# Patient Record
Sex: FEMALE | Race: WHITE | Employment: OTHER | URBAN - METROPOLITAN AREA
[De-identification: names, ages, dates, MRNs, and addresses within clinical notes are randomized per-mention and may not be internally consistent; named-entity substitution may affect disease eponyms.]

---

## 2017-01-01 ENCOUNTER — HOSPITAL ENCOUNTER (OUTPATIENT)
Dept: ONCOLOGY | Age: 57
Discharge: HOME | End: 2017-01-31
Attending: ALLERGY & IMMUNOLOGY | Admitting: ALLERGY & IMMUNOLOGY

## 2017-01-16 ENCOUNTER — HOSPITAL ENCOUNTER (OUTPATIENT)
Dept: ONCOLOGY | Age: 57
Discharge: HOME | End: 2017-01-16
Admitting: ALLERGY & IMMUNOLOGY

## 2017-01-16 VITALS
TEMPERATURE: 97.8 F | HEIGHT: 65 IN | SYSTOLIC BLOOD PRESSURE: 117 MMHG | WEIGHT: 150 LBS | BODY MASS INDEX: 24.99 KG/M2 | RESPIRATION RATE: 16 BRPM | HEART RATE: 88 BPM | DIASTOLIC BLOOD PRESSURE: 73 MMHG

## 2017-01-16 DIAGNOSIS — L50.8 OTHER SPECIFIED URTICARIA: ICD-10-CM

## 2017-02-01 ENCOUNTER — HOSPITAL ENCOUNTER (OUTPATIENT)
Dept: ONCOLOGY | Age: 57
Discharge: HOME | End: 2017-02-03
Attending: ALLERGY & IMMUNOLOGY | Admitting: ALLERGY & IMMUNOLOGY

## 2017-02-06 ENCOUNTER — HOSPITAL ENCOUNTER (OUTPATIENT)
Dept: ONCOLOGY | Age: 57
Discharge: HOME | End: 2017-02-28
Admitting: ALLERGY & IMMUNOLOGY

## 2017-02-06 VITALS
WEIGHT: 150 LBS | SYSTOLIC BLOOD PRESSURE: 150 MMHG | HEIGHT: 65 IN | TEMPERATURE: 98.2 F | RESPIRATION RATE: 16 BRPM | BODY MASS INDEX: 24.99 KG/M2 | DIASTOLIC BLOOD PRESSURE: 79 MMHG | HEART RATE: 79 BPM

## 2017-02-06 DIAGNOSIS — L50.8 OTHER SPECIFIED URTICARIA: ICD-10-CM

## 2017-02-27 ENCOUNTER — HOSPITAL ENCOUNTER (OUTPATIENT)
Dept: ONCOLOGY | Age: 57
Discharge: HOME | End: 2017-02-27
Admitting: ALLERGY & IMMUNOLOGY

## 2017-02-27 VITALS
HEIGHT: 65 IN | WEIGHT: 150 LBS | SYSTOLIC BLOOD PRESSURE: 139 MMHG | RESPIRATION RATE: 16 BRPM | BODY MASS INDEX: 24.99 KG/M2 | DIASTOLIC BLOOD PRESSURE: 85 MMHG | TEMPERATURE: 98.1 F | HEART RATE: 77 BPM

## 2017-02-27 DIAGNOSIS — L50.8 OTHER SPECIFIED URTICARIA: ICD-10-CM

## 2017-02-27 ASSESSMENT — PAIN - FUNCTIONAL ASSESSMENT: PAIN_FUNCTIONAL_ASSESSMENT: 0-10

## 2017-03-20 ENCOUNTER — HOSPITAL ENCOUNTER (OUTPATIENT)
Dept: ONCOLOGY | Age: 57
Discharge: HOME | End: 2017-03-20
Admitting: ALLERGY & IMMUNOLOGY

## 2017-03-20 VITALS
DIASTOLIC BLOOD PRESSURE: 86 MMHG | TEMPERATURE: 98 F | WEIGHT: 150 LBS | RESPIRATION RATE: 18 BRPM | BODY MASS INDEX: 24.99 KG/M2 | SYSTOLIC BLOOD PRESSURE: 122 MMHG | HEART RATE: 92 BPM | HEIGHT: 65 IN

## 2017-03-20 DIAGNOSIS — L50.8 OTHER SPECIFIED URTICARIA: ICD-10-CM

## 2017-03-20 ASSESSMENT — PAIN - FUNCTIONAL ASSESSMENT: PAIN_FUNCTIONAL_ASSESSMENT: 0-10

## 2017-04-10 ENCOUNTER — HOSPITAL ENCOUNTER (OUTPATIENT)
Dept: ONCOLOGY | Age: 57
Discharge: HOME | End: 2017-04-10
Admitting: ALLERGY & IMMUNOLOGY

## 2017-04-10 VITALS
HEART RATE: 93 BPM | HEIGHT: 65 IN | SYSTOLIC BLOOD PRESSURE: 139 MMHG | RESPIRATION RATE: 16 BRPM | DIASTOLIC BLOOD PRESSURE: 79 MMHG | BODY MASS INDEX: 24.99 KG/M2 | WEIGHT: 150 LBS | TEMPERATURE: 98.8 F

## 2017-04-10 DIAGNOSIS — L50.8 OTHER SPECIFIED URTICARIA: ICD-10-CM

## 2017-04-10 ASSESSMENT — PAIN SCALES - GENERAL: PAINLEVEL_OUTOF10: 0

## 2017-05-01 ENCOUNTER — HOSPITAL ENCOUNTER (OUTPATIENT)
Dept: ONCOLOGY | Age: 57
Discharge: HOME | End: 2017-05-31
Attending: ALLERGY & IMMUNOLOGY | Admitting: ALLERGY & IMMUNOLOGY

## 2017-05-01 VITALS
SYSTOLIC BLOOD PRESSURE: 155 MMHG | HEART RATE: 95 BPM | HEIGHT: 65 IN | BODY MASS INDEX: 24.99 KG/M2 | TEMPERATURE: 98.6 F | WEIGHT: 150 LBS | DIASTOLIC BLOOD PRESSURE: 92 MMHG

## 2017-05-01 DIAGNOSIS — L50.8 OTHER SPECIFIED URTICARIA: ICD-10-CM

## 2017-05-01 ASSESSMENT — PAIN - FUNCTIONAL ASSESSMENT: PAIN_FUNCTIONAL_ASSESSMENT: 0-10

## 2017-06-01 ENCOUNTER — HOSPITAL ENCOUNTER (OUTPATIENT)
Dept: ONCOLOGY | Age: 57
Discharge: HOME | End: 2017-06-30
Attending: ALLERGY & IMMUNOLOGY | Admitting: ALLERGY & IMMUNOLOGY

## 2017-06-12 ENCOUNTER — HOSPITAL ENCOUNTER (OUTPATIENT)
Dept: ONCOLOGY | Age: 57
Discharge: HOME | End: 2017-06-12
Admitting: ALLERGY & IMMUNOLOGY

## 2017-06-12 VITALS
HEART RATE: 93 BPM | HEIGHT: 65 IN | RESPIRATION RATE: 16 BRPM | BODY MASS INDEX: 23.82 KG/M2 | DIASTOLIC BLOOD PRESSURE: 91 MMHG | SYSTOLIC BLOOD PRESSURE: 155 MMHG | TEMPERATURE: 97.6 F | WEIGHT: 143 LBS

## 2017-06-12 DIAGNOSIS — L50.8 OTHER SPECIFIED URTICARIA: ICD-10-CM

## 2017-06-12 ASSESSMENT — PAIN - FUNCTIONAL ASSESSMENT: PAIN_FUNCTIONAL_ASSESSMENT: 0-10

## 2017-07-01 ENCOUNTER — HOSPITAL ENCOUNTER (OUTPATIENT)
Dept: ONCOLOGY | Age: 57
Discharge: HOME | End: 2017-07-31
Attending: ALLERGY & IMMUNOLOGY | Admitting: ALLERGY & IMMUNOLOGY

## 2017-07-05 ENCOUNTER — HOSPITAL ENCOUNTER (OUTPATIENT)
Dept: ONCOLOGY | Age: 57
Discharge: HOME | End: 2017-07-05
Admitting: ALLERGY & IMMUNOLOGY

## 2017-07-05 VITALS
BODY MASS INDEX: 24.16 KG/M2 | WEIGHT: 145 LBS | RESPIRATION RATE: 14 BRPM | HEART RATE: 68 BPM | SYSTOLIC BLOOD PRESSURE: 148 MMHG | DIASTOLIC BLOOD PRESSURE: 95 MMHG | TEMPERATURE: 97.2 F | HEIGHT: 65 IN

## 2017-07-05 DIAGNOSIS — L50.8 OTHER SPECIFIED URTICARIA: ICD-10-CM

## 2017-07-05 ASSESSMENT — PAIN SCALES - GENERAL: PAINLEVEL_OUTOF10: 0

## 2017-07-25 ENCOUNTER — HOSPITAL ENCOUNTER (OUTPATIENT)
Dept: ONCOLOGY | Age: 57
Discharge: HOME | End: 2017-07-25
Admitting: ALLERGY & IMMUNOLOGY

## 2017-07-25 VITALS
HEART RATE: 89 BPM | RESPIRATION RATE: 18 BRPM | SYSTOLIC BLOOD PRESSURE: 150 MMHG | HEIGHT: 65 IN | DIASTOLIC BLOOD PRESSURE: 97 MMHG | TEMPERATURE: 98 F | BODY MASS INDEX: 24.16 KG/M2 | WEIGHT: 145 LBS

## 2017-07-25 DIAGNOSIS — L50.8 OTHER SPECIFIED URTICARIA: ICD-10-CM

## 2017-07-25 ASSESSMENT — PAIN - FUNCTIONAL ASSESSMENT: PAIN_FUNCTIONAL_ASSESSMENT: 0-10

## 2017-08-01 ENCOUNTER — HOSPITAL ENCOUNTER (OUTPATIENT)
Dept: ONCOLOGY | Age: 57
Discharge: HOME | End: 2017-08-31
Attending: ALLERGY & IMMUNOLOGY | Admitting: ALLERGY & IMMUNOLOGY

## 2017-09-01 ENCOUNTER — HOSPITAL ENCOUNTER (OUTPATIENT)
Dept: ONCOLOGY | Age: 57
Discharge: HOME | End: 2017-09-30
Attending: ALLERGY & IMMUNOLOGY | Admitting: ALLERGY & IMMUNOLOGY

## 2017-09-05 ENCOUNTER — HOSPITAL ENCOUNTER (OUTPATIENT)
Dept: ONCOLOGY | Age: 57
Discharge: HOME | End: 2017-09-05
Admitting: ALLERGY & IMMUNOLOGY

## 2017-09-05 VITALS
TEMPERATURE: 97 F | HEART RATE: 84 BPM | HEIGHT: 65 IN | WEIGHT: 145 LBS | SYSTOLIC BLOOD PRESSURE: 140 MMHG | BODY MASS INDEX: 24.16 KG/M2 | DIASTOLIC BLOOD PRESSURE: 85 MMHG | RESPIRATION RATE: 18 BRPM

## 2017-09-05 DIAGNOSIS — L50.8 OTHER SPECIFIED URTICARIA: ICD-10-CM

## 2017-09-05 ASSESSMENT — PAIN - FUNCTIONAL ASSESSMENT: PAIN_FUNCTIONAL_ASSESSMENT: 0-10

## 2017-09-25 ENCOUNTER — HOSPITAL ENCOUNTER (OUTPATIENT)
Dept: ONCOLOGY | Age: 57
Discharge: HOME | End: 2017-09-25
Admitting: ALLERGY & IMMUNOLOGY

## 2017-09-25 VITALS
TEMPERATURE: 98.2 F | SYSTOLIC BLOOD PRESSURE: 147 MMHG | HEART RATE: 77 BPM | WEIGHT: 145 LBS | BODY MASS INDEX: 24.16 KG/M2 | DIASTOLIC BLOOD PRESSURE: 89 MMHG | HEIGHT: 65 IN | RESPIRATION RATE: 8 BRPM

## 2017-09-25 ASSESSMENT — PAIN - FUNCTIONAL ASSESSMENT: PAIN_FUNCTIONAL_ASSESSMENT: 0-10

## 2017-10-01 ENCOUNTER — HOSPITAL ENCOUNTER (OUTPATIENT)
Dept: ONCOLOGY | Age: 57
Discharge: HOME | End: 2017-10-31
Attending: ALLERGY & IMMUNOLOGY | Admitting: ALLERGY & IMMUNOLOGY

## 2017-10-16 ENCOUNTER — HOSPITAL ENCOUNTER (OUTPATIENT)
Dept: ONCOLOGY | Age: 57
Discharge: HOME | End: 2017-10-16
Admitting: ALLERGY & IMMUNOLOGY

## 2017-10-16 VITALS
WEIGHT: 150 LBS | SYSTOLIC BLOOD PRESSURE: 130 MMHG | HEIGHT: 65 IN | HEART RATE: 76 BPM | DIASTOLIC BLOOD PRESSURE: 77 MMHG | BODY MASS INDEX: 24.99 KG/M2 | RESPIRATION RATE: 14 BRPM | TEMPERATURE: 97.2 F

## 2017-10-16 DIAGNOSIS — L50.8 OTHER SPECIFIED URTICARIA: ICD-10-CM

## 2017-10-16 NOTE — PLAN OF CARE
IMPORTANT: HOW TO USE THIS INFORMATION:  This is a summary and does NOT have all possible information about this product. This information does not assure that this product is safe, effective, or appropriate for you. This information is not individual medical advice and does not substitute for the advice of your health care professional. Always ask your health care professional for complete information about this product and your specific health needs. OMALIZUMAB - INJECTION (oh-mal-IZ-oo-mab)      COMMON BRAND NAME(S): Xolair      WARNING:  This medication can cause a very serious (sometimes fatal) allergic reaction. In many cases, this type of reaction occurred after the first dose, but it can occur with any later doses. This type of reaction usually occurred within 24 hours or up to 4 days after receiving this medication. Your health care professional will monitor you closely during treatment for any signs of an allergic reaction. Seek immediate medical attention if at any time you develop any signs of an allergic reaction including: trouble breathing, cough, chest tightness, rash, itching, swelling (especially of the throat, tongue), hives, dizziness. If you have had an allergic reaction to omalizumab, you must never use omalizumab again. Omalizumab injection  What is this medicine? OMALIZUMAB (oh saman lye ZOO mab) is used to help treat allergic asthma and other allergies. It may be given with other treatments for these conditions. This medicine may be used for other purposes; ask your health care provider or pharmacist if you have questions. What should I tell my health care provider before I take this medicine?   They need to know if you have any of these conditions:  -lymphoma or other cancer  -an unusual or allergic reaction to omalizumab, hamster proteins, other medicines, foods, dyes, or preservatives  -pregnant or trying to get pregnant  -breast-feeding  How should I use this medicine? This medicine is for injection under the skin. It is given by a health care professional in a hospital or clinic setting. Shari Sutton will be given to you with each injection. Be sure to read this information carefully each time. Talk to your pediatrician regarding the use of this medicine in children. While this drug may be prescribed for children for selected conditions, precautions do apply. Overdosage: If you think you have taken too much of this medicine contact a poison control center or emergency room at once. NOTE: This medicine is only for you. Do not share this medicine with others. What if I miss a dose? It is important not to miss your dose. This medicine is usually given every 2 to 4 weeks. Call your doctor or health care professional if you are unable to keep an appointment. What may interact with this medicine? Interactions are not expected. This list may not describe all possible interactions. Give your health care provider a list of all the medicines, herbs, non-prescription drugs, or dietary supplements you use. Also tell them if you smoke, drink alcohol, or use illegal drugs. Some items may interact with your medicine. What should I watch for while using this medicine? After receiving each injection, you will be monitored for a short time in your doctor's office or clinic. Improvement of your asthma or allergies will not be immediate. Improvements will occur gradually. You will have regular blood tests to determine how often you will need this medicine. Do not stop taking any of your other asthma or allergy treatments unless otherwise directed by your doctor. What side effects may I notice from receiving this medicine?   Side effects that you should report to your doctor or health care professional as soon as possible:  -allergic reactions like skin rash, itching or hives, swelling of the face, lips, or tongue  -breathing problems  -pain, tingling, numbness in the hands or feet  -unusual bleeding or bruising  Side effects that usually do not require medical attention (report to your doctor or health care professional if they continue or are bothersome):  -headache  -mild redness, itching, swelling, or bruising at the injection site  -nausea  This list may not describe all possible side effects.        A Severe allergic reaction called anaphylaxis had happened in some patients after receiving  XOLAIR injections  Anaphylaxis is a life threatening condition and can lead to death  Seek emergency medical treatment immediately if symptoms occur    For ANY signs of anaphylaxic reaction use the epi pen immediately and go to the nearest emergency room    SIGNS AND SYMPTOMS OF ANAPHYLAXIS INCLUDE:    Wheezing, shortness of breath, cough, chest tightness or trouble breathing, dizziness, low blood pressure, fainting, rapid or weak heartbeat,anxiety or feeling of impending doom flushing, itching, hives, swelling of throat or tongue or trouble swallowing    Any other problems notify Nannette Noonan    Return in 3 weeks  for xolair injection    If unable to keep appointment  Call us at [phone] Monday through Friday 7 AM to 3 PM    REMEMBER TO BRING YOUR EPI PEN TO EVERY APPOINTMENT!!!!!

## 2017-11-01 ENCOUNTER — HOSPITAL ENCOUNTER (OUTPATIENT)
Dept: ONCOLOGY | Age: 57
Discharge: HOME | End: 2017-11-30
Attending: ALLERGY & IMMUNOLOGY | Admitting: ALLERGY & IMMUNOLOGY

## 2017-11-06 ENCOUNTER — HOSPITAL ENCOUNTER (OUTPATIENT)
Dept: ONCOLOGY | Age: 57
Discharge: HOME | End: 2017-11-06
Admitting: ALLERGY & IMMUNOLOGY

## 2017-11-06 VITALS
HEART RATE: 93 BPM | HEIGHT: 65 IN | TEMPERATURE: 98.3 F | BODY MASS INDEX: 24.99 KG/M2 | RESPIRATION RATE: 16 BRPM | DIASTOLIC BLOOD PRESSURE: 91 MMHG | WEIGHT: 150 LBS | SYSTOLIC BLOOD PRESSURE: 141 MMHG

## 2017-11-06 DIAGNOSIS — L50.8 OTHER SPECIFIED URTICARIA: ICD-10-CM

## 2017-11-06 ASSESSMENT — PAIN - FUNCTIONAL ASSESSMENT: PAIN_FUNCTIONAL_ASSESSMENT: 0-10

## 2017-11-27 ENCOUNTER — HOSPITAL ENCOUNTER (OUTPATIENT)
Dept: ONCOLOGY | Age: 57
Discharge: HOME | End: 2017-11-27
Admitting: ALLERGY & IMMUNOLOGY

## 2017-11-27 VITALS
HEIGHT: 65 IN | DIASTOLIC BLOOD PRESSURE: 84 MMHG | SYSTOLIC BLOOD PRESSURE: 147 MMHG | HEART RATE: 90 BPM | WEIGHT: 146 LBS | RESPIRATION RATE: 16 BRPM | TEMPERATURE: 97.7 F | BODY MASS INDEX: 24.32 KG/M2

## 2017-11-27 DIAGNOSIS — L50.8 OTHER SPECIFIED URTICARIA: ICD-10-CM

## 2017-11-27 ASSESSMENT — PAIN - FUNCTIONAL ASSESSMENT: PAIN_FUNCTIONAL_ASSESSMENT: 0-10

## 2017-12-01 ENCOUNTER — HOSPITAL ENCOUNTER (OUTPATIENT)
Dept: ONCOLOGY | Age: 57
Discharge: HOME | End: 2017-12-31
Attending: ALLERGY & IMMUNOLOGY | Admitting: ALLERGY & IMMUNOLOGY

## 2017-12-18 ENCOUNTER — HOSPITAL ENCOUNTER (OUTPATIENT)
Dept: ONCOLOGY | Age: 57
Discharge: HOME | End: 2017-12-18
Admitting: ALLERGY & IMMUNOLOGY

## 2017-12-18 VITALS
TEMPERATURE: 97.4 F | SYSTOLIC BLOOD PRESSURE: 125 MMHG | RESPIRATION RATE: 18 BRPM | WEIGHT: 150 LBS | BODY MASS INDEX: 24.99 KG/M2 | HEART RATE: 87 BPM | DIASTOLIC BLOOD PRESSURE: 89 MMHG | HEIGHT: 65 IN

## 2017-12-18 DIAGNOSIS — L50.8 OTHER SPECIFIED URTICARIA: ICD-10-CM

## 2017-12-18 DIAGNOSIS — L50.8 HIVES, PHYSICAL: ICD-10-CM

## 2017-12-18 ASSESSMENT — PAIN - FUNCTIONAL ASSESSMENT: PAIN_FUNCTIONAL_ASSESSMENT: 0-10

## 2018-01-01 ENCOUNTER — HOSPITAL ENCOUNTER (OUTPATIENT)
Dept: ONCOLOGY | Age: 58
Discharge: HOME | End: 2018-01-31
Attending: ALLERGY & IMMUNOLOGY | Admitting: ALLERGY & IMMUNOLOGY

## 2018-01-08 ENCOUNTER — HOSPITAL ENCOUNTER (OUTPATIENT)
Dept: ONCOLOGY | Age: 58
Discharge: HOME | End: 2018-01-08
Admitting: ALLERGY & IMMUNOLOGY

## 2018-01-08 VITALS
HEART RATE: 87 BPM | RESPIRATION RATE: 18 BRPM | DIASTOLIC BLOOD PRESSURE: 86 MMHG | SYSTOLIC BLOOD PRESSURE: 130 MMHG | TEMPERATURE: 98.2 F

## 2018-01-08 DIAGNOSIS — L50.8 OTHER SPECIFIED URTICARIA: ICD-10-CM

## 2018-01-08 ASSESSMENT — PAIN SCALES - GENERAL: PAINLEVEL_OUTOF10: 0

## 2018-01-29 ENCOUNTER — HOSPITAL ENCOUNTER (OUTPATIENT)
Dept: ONCOLOGY | Age: 58
Discharge: HOME | End: 2018-01-29
Admitting: ALLERGY & IMMUNOLOGY

## 2018-01-29 VITALS
BODY MASS INDEX: 24.99 KG/M2 | HEART RATE: 93 BPM | SYSTOLIC BLOOD PRESSURE: 138 MMHG | HEIGHT: 65 IN | WEIGHT: 150 LBS | RESPIRATION RATE: 18 BRPM | DIASTOLIC BLOOD PRESSURE: 87 MMHG | TEMPERATURE: 97.6 F

## 2018-01-29 DIAGNOSIS — L50.8 OTHER SPECIFIED URTICARIA: ICD-10-CM

## 2018-01-29 ASSESSMENT — PAIN - FUNCTIONAL ASSESSMENT: PAIN_FUNCTIONAL_ASSESSMENT: 0-10

## 2018-02-01 ENCOUNTER — HOSPITAL ENCOUNTER (OUTPATIENT)
Dept: ONCOLOGY | Age: 58
Discharge: HOME | End: 2018-02-28
Attending: ALLERGY & IMMUNOLOGY | Admitting: ALLERGY & IMMUNOLOGY

## 2018-02-19 ENCOUNTER — HOSPITAL ENCOUNTER (OUTPATIENT)
Dept: ONCOLOGY | Age: 58
Discharge: HOME | End: 2018-02-20
Admitting: ALLERGY & IMMUNOLOGY

## 2018-02-19 VITALS
HEART RATE: 90 BPM | HEIGHT: 65 IN | WEIGHT: 150 LBS | RESPIRATION RATE: 18 BRPM | SYSTOLIC BLOOD PRESSURE: 146 MMHG | DIASTOLIC BLOOD PRESSURE: 92 MMHG | BODY MASS INDEX: 24.99 KG/M2 | TEMPERATURE: 97.4 F

## 2018-02-19 DIAGNOSIS — L50.8 OTHER SPECIFIED URTICARIA: ICD-10-CM

## 2018-02-19 ASSESSMENT — PAIN - FUNCTIONAL ASSESSMENT: PAIN_FUNCTIONAL_ASSESSMENT: 0-10

## 2018-03-01 ENCOUNTER — HOSPITAL ENCOUNTER (OUTPATIENT)
Dept: ONCOLOGY | Age: 58
Discharge: HOME | End: 2018-03-31
Attending: ALLERGY & IMMUNOLOGY | Admitting: ALLERGY & IMMUNOLOGY

## 2018-03-12 ENCOUNTER — HOSPITAL ENCOUNTER (OUTPATIENT)
Dept: ONCOLOGY | Age: 58
Discharge: HOME | End: 2018-03-13
Admitting: ALLERGY & IMMUNOLOGY

## 2018-03-12 VITALS
RESPIRATION RATE: 18 BRPM | TEMPERATURE: 97.4 F | HEART RATE: 88 BPM | DIASTOLIC BLOOD PRESSURE: 90 MMHG | WEIGHT: 150 LBS | HEIGHT: 65 IN | SYSTOLIC BLOOD PRESSURE: 155 MMHG | BODY MASS INDEX: 24.99 KG/M2

## 2018-03-12 DIAGNOSIS — L50.8 OTHER SPECIFIED URTICARIA: ICD-10-CM

## 2018-03-12 ASSESSMENT — PAIN - FUNCTIONAL ASSESSMENT: PAIN_FUNCTIONAL_ASSESSMENT: 0-10

## 2018-03-12 NOTE — PROGRESS NOTES
Pt tolerates injections well with no s/s of reaction states has epi pen in purse as needed.  Discharged to home in stable condition

## 2018-04-01 ENCOUNTER — HOSPITAL ENCOUNTER (OUTPATIENT)
Dept: ONCOLOGY | Age: 58
Discharge: HOME | End: 2018-04-30
Attending: ALLERGY & IMMUNOLOGY | Admitting: ALLERGY & IMMUNOLOGY

## 2018-04-02 ENCOUNTER — HOSPITAL ENCOUNTER (OUTPATIENT)
Dept: ONCOLOGY | Age: 58
Discharge: HOME | End: 2018-04-03
Admitting: ALLERGY & IMMUNOLOGY

## 2018-04-02 VITALS
SYSTOLIC BLOOD PRESSURE: 135 MMHG | HEIGHT: 65 IN | DIASTOLIC BLOOD PRESSURE: 88 MMHG | TEMPERATURE: 97 F | BODY MASS INDEX: 24.99 KG/M2 | RESPIRATION RATE: 18 BRPM | HEART RATE: 93 BPM | WEIGHT: 150 LBS

## 2018-04-02 DIAGNOSIS — L50.9 URTICARIA: ICD-10-CM

## 2018-04-02 DIAGNOSIS — L50.8 OTHER SPECIFIED URTICARIA: ICD-10-CM

## 2018-04-02 ASSESSMENT — PAIN - FUNCTIONAL ASSESSMENT: PAIN_FUNCTIONAL_ASSESSMENT: 0-10

## 2018-04-23 ENCOUNTER — HOSPITAL ENCOUNTER (OUTPATIENT)
Dept: ONCOLOGY | Age: 58
Discharge: HOME | End: 2018-04-24
Admitting: ALLERGY & IMMUNOLOGY

## 2018-04-23 VITALS
DIASTOLIC BLOOD PRESSURE: 88 MMHG | HEIGHT: 65 IN | HEART RATE: 76 BPM | TEMPERATURE: 97 F | SYSTOLIC BLOOD PRESSURE: 142 MMHG | WEIGHT: 150 LBS | RESPIRATION RATE: 18 BRPM | BODY MASS INDEX: 24.99 KG/M2

## 2018-04-23 DIAGNOSIS — L50.8 OTHER SPECIFIED URTICARIA: ICD-10-CM

## 2018-04-23 DIAGNOSIS — L50.1 IDIOPATHIC URTICARIA: ICD-10-CM

## 2018-04-23 ASSESSMENT — PAIN - FUNCTIONAL ASSESSMENT: PAIN_FUNCTIONAL_ASSESSMENT: 0-10

## 2018-05-01 ENCOUNTER — HOSPITAL ENCOUNTER (OUTPATIENT)
Dept: ONCOLOGY | Age: 58
Discharge: HOME | End: 2018-05-31
Attending: ALLERGY & IMMUNOLOGY | Admitting: ALLERGY & IMMUNOLOGY

## 2018-06-04 ENCOUNTER — HOSPITAL ENCOUNTER (OUTPATIENT)
Dept: ONCOLOGY | Age: 58
Discharge: HOME | End: 2018-06-30
Attending: ALLERGY & IMMUNOLOGY | Admitting: ALLERGY & IMMUNOLOGY

## 2018-06-04 VITALS
HEART RATE: 87 BPM | BODY MASS INDEX: 23.32 KG/M2 | WEIGHT: 140 LBS | HEIGHT: 65 IN | RESPIRATION RATE: 16 BRPM | SYSTOLIC BLOOD PRESSURE: 144 MMHG | TEMPERATURE: 97.4 F | DIASTOLIC BLOOD PRESSURE: 94 MMHG

## 2018-06-04 DIAGNOSIS — L50.8 OTHER SPECIFIED URTICARIA: ICD-10-CM

## 2018-06-25 ENCOUNTER — HOSPITAL ENCOUNTER (OUTPATIENT)
Dept: ONCOLOGY | Age: 58
Discharge: HOME | End: 2018-06-26

## 2018-06-25 DIAGNOSIS — L50.8 OTHER SPECIFIED URTICARIA: ICD-10-CM

## 2018-06-25 DIAGNOSIS — L50.1 IDIOPATHIC URTICARIA: ICD-10-CM

## 2018-07-01 ENCOUNTER — HOSPITAL ENCOUNTER (OUTPATIENT)
Dept: ONCOLOGY | Age: 58
Discharge: HOME | End: 2018-07-01
Attending: ALLERGY & IMMUNOLOGY | Admitting: ALLERGY & IMMUNOLOGY

## 2018-07-16 ENCOUNTER — HOSPITAL ENCOUNTER (OUTPATIENT)
Dept: NURSING | Age: 58
Setting detail: INFUSION SERIES
Discharge: HOME | End: 2018-07-16
Payer: MEDICARE

## 2018-07-16 VITALS
WEIGHT: 140 LBS | BODY MASS INDEX: 23.32 KG/M2 | SYSTOLIC BLOOD PRESSURE: 144 MMHG | DIASTOLIC BLOOD PRESSURE: 77 MMHG | HEIGHT: 65 IN | HEART RATE: 70 BPM | RESPIRATION RATE: 16 BRPM | TEMPERATURE: 97.9 F

## 2018-07-16 DIAGNOSIS — L50.8 OTHER SPECIFIED URTICARIA: ICD-10-CM

## 2018-07-16 DIAGNOSIS — L50.1 IDIOPATHIC URTICARIA: ICD-10-CM

## 2018-07-16 PROCEDURE — 96365 THER/PROPH/DIAG IV INF INIT: CPT

## 2018-07-16 PROCEDURE — 2580000003 HC RX 258: Performed by: ALLERGY & IMMUNOLOGY

## 2018-07-16 PROCEDURE — 6360000002 HC RX W HCPCS: Performed by: ALLERGY & IMMUNOLOGY

## 2018-07-16 PROCEDURE — 96372 THER/PROPH/DIAG INJ SC/IM: CPT

## 2018-07-16 RX ADMIN — OMALIZUMAB 300 MG: 202.5 INJECTION, SOLUTION SUBCUTANEOUS at 11:30

## 2018-07-16 ASSESSMENT — PAIN SCALES - GENERAL: PAINLEVEL_OUTOF10: 0

## 2018-08-06 ENCOUNTER — HOSPITAL ENCOUNTER (OUTPATIENT)
Dept: NURSING | Age: 58
Setting detail: INFUSION SERIES
Discharge: HOME | End: 2018-08-06
Payer: MEDICARE

## 2018-08-06 VITALS
HEIGHT: 65 IN | SYSTOLIC BLOOD PRESSURE: 141 MMHG | TEMPERATURE: 97.9 F | WEIGHT: 140 LBS | RESPIRATION RATE: 16 BRPM | BODY MASS INDEX: 23.32 KG/M2 | DIASTOLIC BLOOD PRESSURE: 85 MMHG | HEART RATE: 91 BPM

## 2018-08-06 DIAGNOSIS — L50.1 IDIOPATHIC URTICARIA: ICD-10-CM

## 2018-08-06 DIAGNOSIS — L50.8 OTHER URTICARIA: ICD-10-CM

## 2018-08-06 PROCEDURE — 96372 THER/PROPH/DIAG INJ SC/IM: CPT

## 2018-08-06 PROCEDURE — 2580000003 HC RX 258: Performed by: ALLERGY & IMMUNOLOGY

## 2018-08-06 PROCEDURE — 6360000002 HC RX W HCPCS: Performed by: ALLERGY & IMMUNOLOGY

## 2018-08-06 RX ADMIN — OMALIZUMAB 300 MG: 202.5 INJECTION, SOLUTION SUBCUTANEOUS at 10:42

## 2018-08-06 ASSESSMENT — PAIN - FUNCTIONAL ASSESSMENT: PAIN_FUNCTIONAL_ASSESSMENT: 0-10

## 2018-08-27 ENCOUNTER — HOSPITAL ENCOUNTER (OUTPATIENT)
Dept: NURSING | Age: 58
Setting detail: INFUSION SERIES
Discharge: HOME | End: 2018-08-27
Payer: MEDICARE

## 2018-08-27 VITALS
HEART RATE: 83 BPM | HEIGHT: 65 IN | TEMPERATURE: 97.8 F | SYSTOLIC BLOOD PRESSURE: 132 MMHG | WEIGHT: 140 LBS | RESPIRATION RATE: 16 BRPM | BODY MASS INDEX: 23.32 KG/M2 | DIASTOLIC BLOOD PRESSURE: 91 MMHG

## 2018-08-27 DIAGNOSIS — L50.8 OTHER URTICARIA: ICD-10-CM

## 2018-08-27 PROCEDURE — 2580000003 HC RX 258: Performed by: ALLERGY & IMMUNOLOGY

## 2018-08-27 PROCEDURE — 96372 THER/PROPH/DIAG INJ SC/IM: CPT

## 2018-08-27 PROCEDURE — 6360000002 HC RX W HCPCS: Performed by: ALLERGY & IMMUNOLOGY

## 2018-08-27 RX ADMIN — OMALIZUMAB 300 MG: 202.5 INJECTION, SOLUTION SUBCUTANEOUS at 10:57

## 2018-08-27 ASSESSMENT — PAIN SCALES - GENERAL: PAINLEVEL_OUTOF10: 0

## 2018-09-07 ENCOUNTER — HOSPITAL ENCOUNTER (OUTPATIENT)
Dept: MRI IMAGING | Age: 58
Discharge: HOME | End: 2018-09-07
Payer: MEDICARE

## 2018-09-07 DIAGNOSIS — S42.002A CLOSED DISPLACED FRACTURE OF LEFT CLAVICLE, UNSPECIFIED PART OF CLAVICLE, INITIAL ENCOUNTER: ICD-10-CM

## 2018-09-07 DIAGNOSIS — M25.512 LEFT SHOULDER PAIN, UNSPECIFIED CHRONICITY: ICD-10-CM

## 2018-09-07 PROCEDURE — 73221 MRI JOINT UPR EXTREM W/O DYE: CPT

## 2018-09-17 ENCOUNTER — HOSPITAL ENCOUNTER (OUTPATIENT)
Dept: NURSING | Age: 58
Setting detail: INFUSION SERIES
Discharge: HOME | End: 2018-09-17
Payer: MEDICARE

## 2018-09-17 VITALS
WEIGHT: 140 LBS | DIASTOLIC BLOOD PRESSURE: 88 MMHG | HEART RATE: 97 BPM | TEMPERATURE: 98 F | SYSTOLIC BLOOD PRESSURE: 146 MMHG | BODY MASS INDEX: 23.32 KG/M2 | HEIGHT: 65 IN | RESPIRATION RATE: 16 BRPM

## 2018-09-17 DIAGNOSIS — L50.8 OTHER URTICARIA: ICD-10-CM

## 2018-09-17 PROCEDURE — 96372 THER/PROPH/DIAG INJ SC/IM: CPT

## 2018-09-17 PROCEDURE — 6360000002 HC RX W HCPCS: Performed by: ALLERGY & IMMUNOLOGY

## 2018-09-17 PROCEDURE — 2580000003 HC RX 258: Performed by: ALLERGY & IMMUNOLOGY

## 2018-09-17 RX ORDER — TRAMADOL HYDROCHLORIDE 50 MG/1
50 TABLET ORAL 2 TIMES DAILY
COMMUNITY
End: 2020-08-10

## 2018-09-17 RX ADMIN — OMALIZUMAB 300 MG: 202.5 INJECTION, SOLUTION SUBCUTANEOUS at 10:40

## 2018-09-17 ASSESSMENT — PAIN - FUNCTIONAL ASSESSMENT: PAIN_FUNCTIONAL_ASSESSMENT: 0-10

## 2018-10-08 ENCOUNTER — HOSPITAL ENCOUNTER (OUTPATIENT)
Dept: NURSING | Age: 58
Setting detail: INFUSION SERIES
Discharge: HOME | End: 2018-10-08
Payer: MEDICARE

## 2018-10-08 VITALS
TEMPERATURE: 97.3 F | DIASTOLIC BLOOD PRESSURE: 88 MMHG | SYSTOLIC BLOOD PRESSURE: 129 MMHG | RESPIRATION RATE: 16 BRPM | HEART RATE: 86 BPM | BODY MASS INDEX: 23.32 KG/M2 | WEIGHT: 140 LBS | HEIGHT: 65 IN

## 2018-10-08 DIAGNOSIS — L50.8 OTHER URTICARIA: ICD-10-CM

## 2018-10-08 PROCEDURE — 2580000003 HC RX 258: Performed by: ALLERGY & IMMUNOLOGY

## 2018-10-08 PROCEDURE — 96372 THER/PROPH/DIAG INJ SC/IM: CPT

## 2018-10-08 PROCEDURE — 6360000002 HC RX W HCPCS: Performed by: ALLERGY & IMMUNOLOGY

## 2018-10-08 RX ADMIN — WATER 300 MG: 1 INJECTION INTRAMUSCULAR; INTRAVENOUS; SUBCUTANEOUS at 10:36

## 2018-10-08 ASSESSMENT — PAIN SCALES - GENERAL: PAINLEVEL_OUTOF10: 0

## 2018-10-29 ENCOUNTER — HOSPITAL ENCOUNTER (OUTPATIENT)
Dept: NURSING | Age: 58
Setting detail: INFUSION SERIES
Discharge: HOME | End: 2018-10-29
Payer: MEDICARE

## 2018-10-29 VITALS
HEIGHT: 65 IN | RESPIRATION RATE: 16 BRPM | BODY MASS INDEX: 23.32 KG/M2 | HEART RATE: 87 BPM | WEIGHT: 140 LBS | SYSTOLIC BLOOD PRESSURE: 120 MMHG | TEMPERATURE: 97.4 F | DIASTOLIC BLOOD PRESSURE: 84 MMHG

## 2018-10-29 DIAGNOSIS — L50.8 OTHER URTICARIA: ICD-10-CM

## 2018-10-29 PROCEDURE — 96372 THER/PROPH/DIAG INJ SC/IM: CPT

## 2018-10-29 PROCEDURE — 6360000002 HC RX W HCPCS: Performed by: ALLERGY & IMMUNOLOGY

## 2018-10-29 PROCEDURE — 2580000003 HC RX 258: Performed by: ALLERGY & IMMUNOLOGY

## 2018-10-29 RX ADMIN — WATER 300 MG: 1 INJECTION INTRAMUSCULAR; INTRAVENOUS; SUBCUTANEOUS at 10:50

## 2018-10-29 ASSESSMENT — PAIN SCALES - GENERAL: PAINLEVEL_OUTOF10: 0

## 2018-10-29 NOTE — PLAN OF CARE
Problem: DISCHARGE BARRIERS  Goal: Patient's continuum of care needs are met  Outcome: Met This Shift

## 2018-11-19 ENCOUNTER — HOSPITAL ENCOUNTER (OUTPATIENT)
Dept: NURSING | Age: 58
Setting detail: INFUSION SERIES
Discharge: HOME | End: 2018-11-19
Payer: MEDICARE

## 2018-11-19 VITALS
RESPIRATION RATE: 16 BRPM | BODY MASS INDEX: 23.32 KG/M2 | HEIGHT: 65 IN | TEMPERATURE: 97.4 F | HEART RATE: 85 BPM | WEIGHT: 140 LBS | SYSTOLIC BLOOD PRESSURE: 149 MMHG | DIASTOLIC BLOOD PRESSURE: 95 MMHG

## 2018-11-19 DIAGNOSIS — L50.8 OTHER URTICARIA: ICD-10-CM

## 2018-11-19 PROCEDURE — 2580000003 HC RX 258: Performed by: ALLERGY & IMMUNOLOGY

## 2018-11-19 PROCEDURE — 6360000002 HC RX W HCPCS: Performed by: ALLERGY & IMMUNOLOGY

## 2018-11-19 PROCEDURE — 96372 THER/PROPH/DIAG INJ SC/IM: CPT

## 2018-11-19 RX ADMIN — OMALIZUMAB 300 MG: 202.5 INJECTION, SOLUTION SUBCUTANEOUS at 11:40

## 2018-11-19 ASSESSMENT — PAIN - FUNCTIONAL ASSESSMENT: PAIN_FUNCTIONAL_ASSESSMENT: 0-10

## 2018-12-10 ENCOUNTER — HOSPITAL ENCOUNTER (OUTPATIENT)
Dept: NURSING | Age: 58
Setting detail: INFUSION SERIES
Discharge: HOME | End: 2018-12-10
Payer: MEDICARE

## 2018-12-10 VITALS
HEART RATE: 79 BPM | WEIGHT: 140 LBS | BODY MASS INDEX: 23.32 KG/M2 | SYSTOLIC BLOOD PRESSURE: 123 MMHG | TEMPERATURE: 97.7 F | HEIGHT: 65 IN | RESPIRATION RATE: 16 BRPM | DIASTOLIC BLOOD PRESSURE: 78 MMHG

## 2018-12-10 DIAGNOSIS — L50.8 OTHER URTICARIA: ICD-10-CM

## 2018-12-10 PROCEDURE — 96372 THER/PROPH/DIAG INJ SC/IM: CPT

## 2018-12-10 PROCEDURE — 2580000003 HC RX 258: Performed by: ALLERGY & IMMUNOLOGY

## 2018-12-10 PROCEDURE — 6360000002 HC RX W HCPCS: Performed by: ALLERGY & IMMUNOLOGY

## 2018-12-10 RX ADMIN — OMALIZUMAB 300 MG: 202.5 INJECTION, SOLUTION SUBCUTANEOUS at 10:30

## 2018-12-10 ASSESSMENT — PAIN SCALES - GENERAL: PAINLEVEL_OUTOF10: 0

## 2018-12-31 ENCOUNTER — HOSPITAL ENCOUNTER (OUTPATIENT)
Dept: NURSING | Age: 58
Setting detail: INFUSION SERIES
Discharge: HOME | End: 2018-12-31
Payer: MEDICARE

## 2018-12-31 VITALS
BODY MASS INDEX: 23.32 KG/M2 | DIASTOLIC BLOOD PRESSURE: 89 MMHG | HEART RATE: 81 BPM | TEMPERATURE: 98.3 F | WEIGHT: 140 LBS | SYSTOLIC BLOOD PRESSURE: 136 MMHG | RESPIRATION RATE: 18 BRPM | HEIGHT: 65 IN

## 2018-12-31 DIAGNOSIS — L50.8 OTHER URTICARIA: ICD-10-CM

## 2018-12-31 PROCEDURE — 96372 THER/PROPH/DIAG INJ SC/IM: CPT

## 2018-12-31 PROCEDURE — 6360000002 HC RX W HCPCS: Performed by: ALLERGY & IMMUNOLOGY

## 2018-12-31 PROCEDURE — 2580000003 HC RX 258: Performed by: ALLERGY & IMMUNOLOGY

## 2018-12-31 RX ADMIN — WATER 300 MG: 1 INJECTION INTRAMUSCULAR; INTRAVENOUS; SUBCUTANEOUS at 11:01

## 2018-12-31 ASSESSMENT — PAIN - FUNCTIONAL ASSESSMENT: PAIN_FUNCTIONAL_ASSESSMENT: 0-10

## 2019-01-21 ENCOUNTER — HOSPITAL ENCOUNTER (OUTPATIENT)
Dept: NURSING | Age: 59
Setting detail: INFUSION SERIES
Discharge: HOME | End: 2019-01-21
Payer: MEDICARE

## 2019-01-21 VITALS
DIASTOLIC BLOOD PRESSURE: 79 MMHG | WEIGHT: 140 LBS | BODY MASS INDEX: 23.32 KG/M2 | SYSTOLIC BLOOD PRESSURE: 121 MMHG | TEMPERATURE: 97.8 F | HEIGHT: 65 IN | RESPIRATION RATE: 16 BRPM | HEART RATE: 82 BPM

## 2019-01-21 DIAGNOSIS — L50.8 OTHER URTICARIA: ICD-10-CM

## 2019-01-21 PROCEDURE — 2580000003 HC RX 258: Performed by: ALLERGY & IMMUNOLOGY

## 2019-01-21 PROCEDURE — 6360000002 HC RX W HCPCS: Performed by: ALLERGY & IMMUNOLOGY

## 2019-01-21 PROCEDURE — 96372 THER/PROPH/DIAG INJ SC/IM: CPT

## 2019-01-21 RX ADMIN — WATER 300 MG: 1 INJECTION INTRAMUSCULAR; INTRAVENOUS; SUBCUTANEOUS at 10:50

## 2019-01-21 ASSESSMENT — PAIN SCALES - GENERAL: PAINLEVEL_OUTOF10: 0

## 2019-02-11 ENCOUNTER — HOSPITAL ENCOUNTER (OUTPATIENT)
Dept: NURSING | Age: 59
Setting detail: INFUSION SERIES
Discharge: HOME | End: 2019-02-11
Payer: MEDICARE

## 2019-02-11 VITALS
BODY MASS INDEX: 23.32 KG/M2 | WEIGHT: 140 LBS | HEART RATE: 95 BPM | TEMPERATURE: 97 F | SYSTOLIC BLOOD PRESSURE: 142 MMHG | DIASTOLIC BLOOD PRESSURE: 97 MMHG | HEIGHT: 65 IN | RESPIRATION RATE: 16 BRPM

## 2019-02-11 DIAGNOSIS — J45.52 SEVERE PERSISTENT ASTHMA WITH STATUS ASTHMATICUS: ICD-10-CM

## 2019-02-11 DIAGNOSIS — L50.8 OTHER URTICARIA: ICD-10-CM

## 2019-02-11 PROCEDURE — 96372 THER/PROPH/DIAG INJ SC/IM: CPT

## 2019-02-11 PROCEDURE — 6360000002 HC RX W HCPCS: Performed by: ALLERGY & IMMUNOLOGY

## 2019-02-11 PROCEDURE — 2580000003 HC RX 258: Performed by: ALLERGY & IMMUNOLOGY

## 2019-02-11 RX ADMIN — WATER 300 MG: 1 INJECTION INTRAMUSCULAR; INTRAVENOUS; SUBCUTANEOUS at 13:30

## 2019-02-11 ASSESSMENT — PAIN SCALES - GENERAL: PAINLEVEL_OUTOF10: 0

## 2019-03-04 ENCOUNTER — HOSPITAL ENCOUNTER (OUTPATIENT)
Dept: NURSING | Age: 59
Setting detail: INFUSION SERIES
Discharge: HOME | End: 2019-03-04
Payer: MEDICARE

## 2019-03-04 VITALS
TEMPERATURE: 98.2 F | HEART RATE: 79 BPM | BODY MASS INDEX: 23.32 KG/M2 | HEIGHT: 65 IN | RESPIRATION RATE: 16 BRPM | WEIGHT: 140 LBS | SYSTOLIC BLOOD PRESSURE: 133 MMHG | DIASTOLIC BLOOD PRESSURE: 76 MMHG

## 2019-03-04 DIAGNOSIS — L50.8 OTHER URTICARIA: Primary | ICD-10-CM

## 2019-03-04 DIAGNOSIS — J45.52 SEVERE PERSISTENT ASTHMA WITH STATUS ASTHMATICUS: ICD-10-CM

## 2019-03-04 PROCEDURE — 2580000003 HC RX 258: Performed by: ALLERGY & IMMUNOLOGY

## 2019-03-04 PROCEDURE — 6360000002 HC RX W HCPCS: Performed by: ALLERGY & IMMUNOLOGY

## 2019-03-04 PROCEDURE — 96372 THER/PROPH/DIAG INJ SC/IM: CPT

## 2019-03-04 RX ADMIN — WATER 300 MG: 1 INJECTION INTRAMUSCULAR; INTRAVENOUS; SUBCUTANEOUS at 10:52

## 2019-03-04 ASSESSMENT — PAIN - FUNCTIONAL ASSESSMENT: PAIN_FUNCTIONAL_ASSESSMENT: 0-10

## 2019-03-25 ENCOUNTER — HOSPITAL ENCOUNTER (OUTPATIENT)
Dept: NURSING | Age: 59
Setting detail: INFUSION SERIES
Discharge: HOME | End: 2019-03-25
Payer: MEDICARE

## 2019-03-25 VITALS
HEIGHT: 65 IN | DIASTOLIC BLOOD PRESSURE: 80 MMHG | SYSTOLIC BLOOD PRESSURE: 119 MMHG | HEART RATE: 77 BPM | BODY MASS INDEX: 23.32 KG/M2 | RESPIRATION RATE: 16 BRPM | WEIGHT: 140 LBS | TEMPERATURE: 97 F

## 2019-03-25 DIAGNOSIS — J45.52 SEVERE PERSISTENT ASTHMA WITH STATUS ASTHMATICUS: ICD-10-CM

## 2019-03-25 DIAGNOSIS — L50.8 OTHER URTICARIA: Primary | ICD-10-CM

## 2019-03-25 PROCEDURE — 96372 THER/PROPH/DIAG INJ SC/IM: CPT

## 2019-03-25 PROCEDURE — 2580000003 HC RX 258: Performed by: ALLERGY & IMMUNOLOGY

## 2019-03-25 PROCEDURE — 6360000002 HC RX W HCPCS: Performed by: ALLERGY & IMMUNOLOGY

## 2019-03-25 RX ADMIN — OMALIZUMAB 300 MG: 202.5 INJECTION, SOLUTION SUBCUTANEOUS at 10:56

## 2019-03-25 NOTE — PROGRESS NOTES
Patient has tolerated her injections of xolair 150 mg into each arm. Left in stable condition ambulatory.

## 2019-04-15 ENCOUNTER — HOSPITAL ENCOUNTER (OUTPATIENT)
Dept: NURSING | Age: 59
Setting detail: INFUSION SERIES
Discharge: HOME | End: 2019-04-15
Payer: MEDICARE

## 2019-04-15 VITALS
WEIGHT: 140 LBS | RESPIRATION RATE: 16 BRPM | SYSTOLIC BLOOD PRESSURE: 156 MMHG | BODY MASS INDEX: 23.32 KG/M2 | HEIGHT: 65 IN | TEMPERATURE: 96.9 F | DIASTOLIC BLOOD PRESSURE: 82 MMHG | HEART RATE: 85 BPM

## 2019-04-15 DIAGNOSIS — J45.52 SEVERE PERSISTENT ASTHMA WITH STATUS ASTHMATICUS: ICD-10-CM

## 2019-04-15 DIAGNOSIS — L50.8 OTHER URTICARIA: Primary | ICD-10-CM

## 2019-04-15 PROCEDURE — 96372 THER/PROPH/DIAG INJ SC/IM: CPT

## 2019-04-15 PROCEDURE — 6360000002 HC RX W HCPCS: Performed by: ALLERGY & IMMUNOLOGY

## 2019-04-15 PROCEDURE — 2580000003 HC RX 258: Performed by: ALLERGY & IMMUNOLOGY

## 2019-04-15 RX ADMIN — WATER 300 MG: 1 INJECTION INTRAMUSCULAR; INTRAVENOUS; SUBCUTANEOUS at 10:29

## 2019-04-15 ASSESSMENT — PAIN - FUNCTIONAL ASSESSMENT: PAIN_FUNCTIONAL_ASSESSMENT: 0-10

## 2019-05-03 ENCOUNTER — HOSPITAL ENCOUNTER (OUTPATIENT)
Dept: NURSING | Age: 59
Setting detail: INFUSION SERIES
Discharge: HOME | End: 2019-05-03
Payer: MEDICARE

## 2019-05-03 VITALS
WEIGHT: 140 LBS | RESPIRATION RATE: 16 BRPM | HEART RATE: 95 BPM | SYSTOLIC BLOOD PRESSURE: 145 MMHG | DIASTOLIC BLOOD PRESSURE: 90 MMHG | HEIGHT: 65 IN | BODY MASS INDEX: 23.32 KG/M2 | TEMPERATURE: 97.6 F

## 2019-05-03 DIAGNOSIS — L50.8 OTHER URTICARIA: Primary | ICD-10-CM

## 2019-05-03 DIAGNOSIS — J45.52 SEVERE PERSISTENT ASTHMA WITH STATUS ASTHMATICUS: ICD-10-CM

## 2019-05-03 PROCEDURE — 2580000003 HC RX 258: Performed by: ALLERGY & IMMUNOLOGY

## 2019-05-03 PROCEDURE — 6360000002 HC RX W HCPCS: Performed by: ALLERGY & IMMUNOLOGY

## 2019-05-03 PROCEDURE — 96372 THER/PROPH/DIAG INJ SC/IM: CPT

## 2019-05-03 RX ADMIN — OMALIZUMAB 300 MG: 202.5 INJECTION, SOLUTION SUBCUTANEOUS at 10:04

## 2019-05-03 ASSESSMENT — PAIN SCALES - GENERAL: PAINLEVEL_OUTOF10: 0

## 2019-05-03 NOTE — PROGRESS NOTES
Patient has tolerated her 2 injections of xolair into each arm. Left in stable condition ambulatory.

## 2019-05-21 ENCOUNTER — HOSPITAL ENCOUNTER (EMERGENCY)
Age: 59
Discharge: HOME | End: 2019-05-21
Attending: EMERGENCY MEDICINE
Payer: MEDICARE

## 2019-05-21 ENCOUNTER — APPOINTMENT (OUTPATIENT)
Dept: CT IMAGING | Age: 59
End: 2019-05-21
Payer: MEDICARE

## 2019-05-21 VITALS
OXYGEN SATURATION: 99 % | WEIGHT: 145 LBS | TEMPERATURE: 98 F | HEART RATE: 66 BPM | RESPIRATION RATE: 16 BRPM | BODY MASS INDEX: 24.13 KG/M2 | DIASTOLIC BLOOD PRESSURE: 80 MMHG | SYSTOLIC BLOOD PRESSURE: 120 MMHG

## 2019-05-21 DIAGNOSIS — R29.810 FACIAL DROOP: Primary | ICD-10-CM

## 2019-05-21 DIAGNOSIS — E83.42 HYPOMAGNESEMIA: ICD-10-CM

## 2019-05-21 DIAGNOSIS — E04.1 THYROID NODULE: ICD-10-CM

## 2019-05-21 DIAGNOSIS — I67.1 INTERNAL CAROTID ANEURYSM: ICD-10-CM

## 2019-05-21 LAB
A/G RATIO: 1.5 (ref 1.1–2.2)
ALBUMIN SERPL-MCNC: 4.2 G/DL (ref 3.4–5)
ALP BLD-CCNC: 59 U/L (ref 40–129)
ALT SERPL-CCNC: 20 U/L (ref 10–40)
ANION GAP SERPL CALCULATED.3IONS-SCNC: 10 MMOL/L (ref 3–16)
AST SERPL-CCNC: 21 U/L (ref 15–37)
BASOPHILS ABSOLUTE: 0.1 K/UL (ref 0–0.2)
BASOPHILS RELATIVE PERCENT: 1.5 %
BILIRUB SERPL-MCNC: 0.5 MG/DL (ref 0–1)
BUN BLDV-MCNC: 14 MG/DL (ref 7–20)
CALCIUM SERPL-MCNC: 9.1 MG/DL (ref 8.3–10.6)
CHLORIDE BLD-SCNC: 101 MMOL/L (ref 99–110)
CO2: 28 MMOL/L (ref 21–32)
CREAT SERPL-MCNC: 0.8 MG/DL (ref 0.6–1.1)
EKG ATRIAL RATE: 82 BPM
EKG DIAGNOSIS: NORMAL
EKG P AXIS: 41 DEGREES
EKG P-R INTERVAL: 144 MS
EKG Q-T INTERVAL: 410 MS
EKG QRS DURATION: 80 MS
EKG QTC CALCULATION (BAZETT): 479 MS
EKG R AXIS: 15 DEGREES
EKG T AXIS: 26 DEGREES
EKG VENTRICULAR RATE: 82 BPM
EOSINOPHILS ABSOLUTE: 0.2 K/UL (ref 0–0.6)
EOSINOPHILS RELATIVE PERCENT: 4.4 %
GFR AFRICAN AMERICAN: >60
GFR NON-AFRICAN AMERICAN: >60
GLOBULIN: 2.8 G/DL
GLUCOSE BLD-MCNC: 115 MG/DL (ref 70–99)
HCT VFR BLD CALC: 41.8 % (ref 36–48)
HEMOGLOBIN: 14.3 G/DL (ref 12–16)
INR BLD: 0.97 (ref 0.86–1.14)
LYMPHOCYTES ABSOLUTE: 1.7 K/UL (ref 1–5.1)
LYMPHOCYTES RELATIVE PERCENT: 46.5 %
MAGNESIUM: 1.7 MG/DL (ref 1.8–2.4)
MCH RBC QN AUTO: 32.6 PG (ref 26–34)
MCHC RBC AUTO-ENTMCNC: 34.3 G/DL (ref 31–36)
MCV RBC AUTO: 95.2 FL (ref 80–100)
MONOCYTES ABSOLUTE: 0.4 K/UL (ref 0–1.3)
MONOCYTES RELATIVE PERCENT: 11.7 %
NEUTROPHILS ABSOLUTE: 1.3 K/UL (ref 1.7–7.7)
NEUTROPHILS RELATIVE PERCENT: 35.9 %
PDW BLD-RTO: 12.4 % (ref 12.4–15.4)
PLATELET # BLD: 232 K/UL (ref 135–450)
PMV BLD AUTO: 6.8 FL (ref 5–10.5)
POTASSIUM SERPL-SCNC: 3.9 MMOL/L (ref 3.5–5.1)
PROTHROMBIN TIME: 11.1 SEC (ref 9.8–13)
RBC # BLD: 4.39 M/UL (ref 4–5.2)
SODIUM BLD-SCNC: 139 MMOL/L (ref 136–145)
TOTAL PROTEIN: 7 G/DL (ref 6.4–8.2)
TROPONIN: <0.01 NG/ML
WBC # BLD: 3.7 K/UL (ref 4–11)

## 2019-05-21 PROCEDURE — 6360000002 HC RX W HCPCS: Performed by: EMERGENCY MEDICINE

## 2019-05-21 PROCEDURE — 80053 COMPREHEN METABOLIC PANEL: CPT

## 2019-05-21 PROCEDURE — 6360000004 HC RX CONTRAST MEDICATION: Performed by: EMERGENCY MEDICINE

## 2019-05-21 PROCEDURE — 96366 THER/PROPH/DIAG IV INF ADDON: CPT

## 2019-05-21 PROCEDURE — 70498 CT ANGIOGRAPHY NECK: CPT

## 2019-05-21 PROCEDURE — 99284 EMERGENCY DEPT VISIT MOD MDM: CPT

## 2019-05-21 PROCEDURE — 93010 ELECTROCARDIOGRAM REPORT: CPT | Performed by: INTERNAL MEDICINE

## 2019-05-21 PROCEDURE — 84484 ASSAY OF TROPONIN QUANT: CPT

## 2019-05-21 PROCEDURE — 70496 CT ANGIOGRAPHY HEAD: CPT

## 2019-05-21 PROCEDURE — 70450 CT HEAD/BRAIN W/O DYE: CPT

## 2019-05-21 PROCEDURE — 83735 ASSAY OF MAGNESIUM: CPT

## 2019-05-21 PROCEDURE — 85610 PROTHROMBIN TIME: CPT

## 2019-05-21 PROCEDURE — 85025 COMPLETE CBC W/AUTO DIFF WBC: CPT

## 2019-05-21 PROCEDURE — 96365 THER/PROPH/DIAG IV INF INIT: CPT

## 2019-05-21 PROCEDURE — 93005 ELECTROCARDIOGRAM TRACING: CPT | Performed by: EMERGENCY MEDICINE

## 2019-05-21 RX ORDER — PREDNISONE 20 MG/1
60 TABLET ORAL DAILY
Qty: 12 TABLET | Refills: 0 | Status: SHIPPED | OUTPATIENT
Start: 2019-05-21

## 2019-05-21 RX ORDER — MAGNESIUM SULFATE IN WATER 40 MG/ML
2 INJECTION, SOLUTION INTRAVENOUS ONCE
Status: COMPLETED | OUTPATIENT
Start: 2019-05-21 | End: 2019-05-21

## 2019-05-21 RX ORDER — VALACYCLOVIR HYDROCHLORIDE 1 G/1
1000 TABLET, FILM COATED ORAL 3 TIMES DAILY
Qty: 21 TABLET | Refills: 0 | Status: SHIPPED | OUTPATIENT
Start: 2019-05-21 | End: 2019-05-28

## 2019-05-21 RX ORDER — MAGNESIUM SULFATE HEPTAHYDRATE 500 MG/ML
2 INJECTION, SOLUTION INTRAMUSCULAR; INTRAVENOUS ONCE
Status: DISCONTINUED | OUTPATIENT
Start: 2019-05-21 | End: 2019-05-21

## 2019-05-21 RX ADMIN — MAGNESIUM SULFATE HEPTAHYDRATE 2 G: 40 INJECTION, SOLUTION INTRAVENOUS at 10:59

## 2019-05-21 RX ADMIN — IOPAMIDOL 75 ML: 755 INJECTION, SOLUTION INTRAVENOUS at 12:27
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
## 2019-05-28 ENCOUNTER — HOSPITAL ENCOUNTER (OUTPATIENT)
Dept: NURSING | Age: 59
Setting detail: INFUSION SERIES
Discharge: HOME | End: 2019-05-28
Payer: MEDICARE

## 2019-05-28 VITALS
DIASTOLIC BLOOD PRESSURE: 95 MMHG | TEMPERATURE: 97.5 F | HEART RATE: 89 BPM | SYSTOLIC BLOOD PRESSURE: 145 MMHG | WEIGHT: 145 LBS | BODY MASS INDEX: 24.16 KG/M2 | HEIGHT: 65 IN | RESPIRATION RATE: 16 BRPM

## 2019-05-28 DIAGNOSIS — L50.8 OTHER URTICARIA: Primary | ICD-10-CM

## 2019-05-28 DIAGNOSIS — J45.52 SEVERE PERSISTENT ASTHMA WITH STATUS ASTHMATICUS: ICD-10-CM

## 2019-05-28 PROCEDURE — 96372 THER/PROPH/DIAG INJ SC/IM: CPT

## 2019-05-28 PROCEDURE — 6360000002 HC RX W HCPCS: Performed by: ALLERGY & IMMUNOLOGY

## 2019-05-28 PROCEDURE — 2580000003 HC RX 258: Performed by: ALLERGY & IMMUNOLOGY

## 2019-05-28 RX ADMIN — WATER 300 MG: 1 INJECTION INTRAMUSCULAR; INTRAVENOUS; SUBCUTANEOUS at 09:58

## 2019-05-28 ASSESSMENT — PAIN - FUNCTIONAL ASSESSMENT: PAIN_FUNCTIONAL_ASSESSMENT: 0-10

## 2019-06-14 ENCOUNTER — OFFICE VISIT (OUTPATIENT)
Dept: VASCULAR SURGERY | Age: 59
End: 2019-06-14
Payer: MEDICARE

## 2019-06-14 VITALS
DIASTOLIC BLOOD PRESSURE: 80 MMHG | HEIGHT: 65 IN | WEIGHT: 143 LBS | BODY MASS INDEX: 23.82 KG/M2 | SYSTOLIC BLOOD PRESSURE: 118 MMHG

## 2019-06-14 DIAGNOSIS — I67.1 INTRACRANIAL ANEURYSM: Primary | ICD-10-CM

## 2019-06-14 PROCEDURE — G8420 CALC BMI NORM PARAMETERS: HCPCS | Performed by: SURGERY

## 2019-06-14 PROCEDURE — 99202 OFFICE O/P NEW SF 15 MIN: CPT | Performed by: SURGERY

## 2019-06-14 PROCEDURE — G8427 DOCREV CUR MEDS BY ELIG CLIN: HCPCS | Performed by: SURGERY

## 2019-06-14 RX ORDER — RANITIDINE 150 MG/1
150 TABLET ORAL 2 TIMES DAILY
COMMUNITY
End: 2021-06-01

## 2019-06-14 NOTE — PROGRESS NOTES
Outpatient Consultation / H&P    Date of Consultation:  6/14/2019    PCP:  MUSA Villarreal CNP     Referring Provider:  Dr. Queenie Gonzalez     Chief Complaint:   Chief Complaint   Patient presents with    Other     ref by Little Mcghee for carotid artery aneursym. pammargie       History of Present Illness: We are asked to see this patient in consultation by Dr. Queenie Gonzalez regarding Carotid disease. Bonilla Addison is a 63 y.o. female who presented to ED with facial droop. Ultimately diagnosed with [place] palsy. CTA of Head and Neck performed and small Intracranial Carotid aneurysms noted. Patient referred her by ED for evaluation of this. Past Medical History:  Past Medical History:   Diagnosis Date    Asthma     Narcotic abuse ([facility] [address]     Vocal cord dysfunction     tracheal malasia       Past Surgical History:  Past Surgical History:   Procedure Laterality Date    TRACHEOSTOMY      TRACHEOSTOMY TUBE CHANGE  April 28, 2014       Home Medications:   Prior to Admission medications    Medication Sig Start Date End Date Taking? Authorizing Provider   Cetirizine HCl (ZYRTEC ALLERGY) 10 MG CAPS Take by mouth   Yes Historical Provider, MD   ranitidine (ZANTAC) 150 MG tablet Take 150 mg by mouth 2 times daily   Yes Historical Provider, MD   predniSONE (DELTASONE) 20 MG tablet Take 3 tablets by mouth daily 5/21/19  Yes Zachary Proper,    traMADol (ULTRAM) 50 MG tablet Take 50 mg by mouth 2 times daily. Luc Zimmerman Historical Provider, MD   diphenhydrAMINE (BENYLIN) 12.5 MG/5ML liquid Take 25 mg by mouth 4 times daily as needed for Allergies    Historical Provider, MD   naproxen (NAPROSYN) 500 MG tablet Take 1 tablet by mouth 2 times daily 9/23/15   YULIANA Bishop   fluticasone-salmeterol (ADVAIR) 250-50 MCG/DOSE AEPB Inhale 1 puff into the lungs every 12 hours    Historical Provider, MD   levalbuterol (XOPENEX) 1.25 MG/3ML nebulizer solution Take 3 mLs by nebulization every 4 hours as needed for Wheezing.  4/9/14 Berenice Moses MD   levalbuterol Bev Lyles) 1.25 MG/3ML nebulizer solution Take 1 ampule by nebulization every 4 hours as needed. Historical Provider, MD   EPINEPHrine HCl, Anaphylaxis, (EPIPEN IM) Inject  into the muscle as needed. Historical Provider, MD        Allergies:  Ultracet [tramadol-acetaminophen]      Social History:      Social History     Socioeconomic History    Marital status:      Spouse name: Not on file    Number of children: Not on file    Years of education: Not on file    Highest education level: Not on file   Occupational History    Occupation: shipping     Comment: [employer]   Social Needs    Financial resource strain: Not on file    Food insecurity:     Worry: Not on file     Inability: Not on file    Transportation needs:     Medical: Not on file     Non-medical: Not on file   Tobacco Use    Smoking status: Never Smoker    Smokeless tobacco: Never Used   Substance and Sexual Activity    Alcohol use: Yes     Alcohol/week: 3.6 oz     Types: 6 Cans of beer per week     Comment: weekly    Drug use: No     Comment: heroin.   Pt denies use on 9/25/12    Sexual activity: Yes     Partners: Male   Lifestyle    Physical activity:     Days per week: Not on file     Minutes per session: Not on file    Stress: Not on file   Relationships    Social connections:     Talks on phone: Not on file     Gets together: Not on file     Attends Spiritism service: Not on file     Active member of club or organization: Not on file     Attends meetings of clubs or organizations: Not on file     Relationship status: Not on file    Intimate partner violence:     Fear of current or ex partner: Not on file     Emotionally abused: Not on file     Physically abused: Not on file     Forced sexual activity: Not on file   Other Topics Concern    Not on file   Social History Narrative    Not on file       Family History:        Problem Relation Age of Onset    Cancer Father     Asthma Daughter     Diabetes Neg Hx     Emphysema Neg Hx     Heart Failure Neg Hx     Hypertension Neg Hx        Review of Systems:  A 14 point review of systems was completed. Pertinent positives identified in the HPI, all other review of systems negative. Physical Examination:    /80 (Site: Left Upper Arm)   Ht 5' 5\" (1.651 m)   Wt 143 lb (64.9 kg)   LMP 01/18/2013   BMI 23.80 kg/m²     Weight: 143 lb (64.9 kg)       General appearance: NAD  Eyes: PERRLA  Neck: no JVD, no lymphadenopathy. Respiratory: effort is unlabored, no crackles, wheezes or rubs. Cardiovascular: regular, no murmur. No carotid bruits. No edema or varicosities. Pulses:    carotid radial   RIGHT 2 2   LEFT 2 2     Musculoskeletal: strength and tone normal.  Extremities: warm and pink. Skin: no dermatitis or ulceration. Neuro/psychiatric: grossly intact. MEDICAL DECISION MAKING/TESTING        CT: personally reviewed. 1. No CT evidence of acute intracranial abnormalities.  No CT evidence of   acute cortical infarct or intracranial hemorrhage.       2. No high-grade stenosis or focal occlusion involving intracranial, or   cervical vasculature.  No evidence of acute dissection.       3. Possible 2 mm aneurysms arising from right internal carotid artery, and   left internal carotid artery, as detailed above.       4. Heterogeneously enlarged thyroid gland with multiple thyroid nodules. Consider nonemergent thyroid ultrasound as an outpatient.       5. Previous maxillary sinus and the right orbital floor surgical fixation,   with near complete opacification of right maxillary sinus.  Dedicated CT   sinus/face may be obtained, as clinically warranted.               Assessment:      Diagnosis Orders   1. Intracranial aneurysm       No evidence of cervical Carotid disease.      Recommendations/Plan:    Patient sent to me by ED physician, however really should see Neursosurgery regarding this as I do not treat intra cranial pathology. Intracranial aneurysms very small and likely will be followed with imaging.        Mango Moore MD, FACS

## 2019-06-17 ENCOUNTER — HOSPITAL ENCOUNTER (OUTPATIENT)
Dept: NURSING | Age: 59
Setting detail: INFUSION SERIES
Discharge: HOME | End: 2019-06-17
Payer: MEDICARE

## 2019-06-17 VITALS
RESPIRATION RATE: 16 BRPM | SYSTOLIC BLOOD PRESSURE: 133 MMHG | WEIGHT: 143 LBS | BODY MASS INDEX: 23.82 KG/M2 | HEART RATE: 81 BPM | TEMPERATURE: 98.1 F | DIASTOLIC BLOOD PRESSURE: 74 MMHG | HEIGHT: 65 IN

## 2019-06-17 DIAGNOSIS — L50.8 OTHER URTICARIA: Primary | ICD-10-CM

## 2019-06-17 PROCEDURE — 6360000002 HC RX W HCPCS: Performed by: ALLERGY & IMMUNOLOGY

## 2019-06-17 PROCEDURE — 96372 THER/PROPH/DIAG INJ SC/IM: CPT

## 2019-06-17 RX ADMIN — OMALIZUMAB 300 MG: 202.5 INJECTION, SOLUTION SUBCUTANEOUS at 10:18

## 2019-06-17 ASSESSMENT — PAIN - FUNCTIONAL ASSESSMENT: PAIN_FUNCTIONAL_ASSESSMENT: 0-10

## 2019-07-08 ENCOUNTER — HOSPITAL ENCOUNTER (OUTPATIENT)
Dept: NURSING | Age: 59
Setting detail: INFUSION SERIES
Discharge: HOME | End: 2019-07-08
Payer: MEDICARE

## 2019-07-08 VITALS
BODY MASS INDEX: 23.82 KG/M2 | DIASTOLIC BLOOD PRESSURE: 83 MMHG | SYSTOLIC BLOOD PRESSURE: 127 MMHG | WEIGHT: 143 LBS | TEMPERATURE: 97 F | HEIGHT: 65 IN | RESPIRATION RATE: 18 BRPM | HEART RATE: 90 BPM

## 2019-07-08 DIAGNOSIS — L50.8 OTHER URTICARIA: Primary | ICD-10-CM

## 2019-07-08 PROCEDURE — 6360000002 HC RX W HCPCS: Performed by: ALLERGY & IMMUNOLOGY

## 2019-07-08 PROCEDURE — 96372 THER/PROPH/DIAG INJ SC/IM: CPT

## 2019-07-08 RX ADMIN — OMALIZUMAB 300 MG: 202.5 INJECTION, SOLUTION SUBCUTANEOUS at 10:02

## 2019-07-08 ASSESSMENT — PAIN - FUNCTIONAL ASSESSMENT: PAIN_FUNCTIONAL_ASSESSMENT: 0-10

## 2019-07-29 ENCOUNTER — HOSPITAL ENCOUNTER (OUTPATIENT)
Dept: NURSING | Age: 59
Setting detail: INFUSION SERIES
Discharge: HOME | End: 2019-07-29
Payer: MEDICARE

## 2019-07-29 VITALS
SYSTOLIC BLOOD PRESSURE: 136 MMHG | HEART RATE: 83 BPM | HEIGHT: 65 IN | RESPIRATION RATE: 18 BRPM | TEMPERATURE: 97.4 F | DIASTOLIC BLOOD PRESSURE: 93 MMHG | BODY MASS INDEX: 23.82 KG/M2 | WEIGHT: 143 LBS

## 2019-07-29 DIAGNOSIS — L50.8 OTHER URTICARIA: Primary | ICD-10-CM

## 2019-07-29 PROCEDURE — 96372 THER/PROPH/DIAG INJ SC/IM: CPT

## 2019-07-29 PROCEDURE — 6360000002 HC RX W HCPCS: Performed by: ALLERGY & IMMUNOLOGY

## 2019-07-29 RX ADMIN — OMALIZUMAB 300 MG: 202.5 INJECTION, SOLUTION SUBCUTANEOUS at 10:27

## 2019-07-29 ASSESSMENT — PAIN - FUNCTIONAL ASSESSMENT: PAIN_FUNCTIONAL_ASSESSMENT: 0-10

## 2019-08-19 ENCOUNTER — HOSPITAL ENCOUNTER (OUTPATIENT)
Dept: NURSING | Age: 59
Setting detail: INFUSION SERIES
Discharge: HOME | End: 2019-08-19
Payer: MEDICARE

## 2019-08-19 VITALS
RESPIRATION RATE: 18 BRPM | SYSTOLIC BLOOD PRESSURE: 128 MMHG | DIASTOLIC BLOOD PRESSURE: 80 MMHG | TEMPERATURE: 97.8 F | BODY MASS INDEX: 23.82 KG/M2 | HEIGHT: 65 IN | WEIGHT: 143 LBS | HEART RATE: 82 BPM

## 2019-08-19 DIAGNOSIS — L50.8 OTHER URTICARIA: Primary | ICD-10-CM

## 2019-08-19 PROCEDURE — 96372 THER/PROPH/DIAG INJ SC/IM: CPT

## 2019-08-19 PROCEDURE — 6360000002 HC RX W HCPCS: Performed by: ALLERGY & IMMUNOLOGY

## 2019-08-19 RX ADMIN — OMALIZUMAB 300 MG: 202.5 INJECTION, SOLUTION SUBCUTANEOUS at 10:34

## 2019-08-19 ASSESSMENT — PAIN - FUNCTIONAL ASSESSMENT: PAIN_FUNCTIONAL_ASSESSMENT: 0-10

## 2019-09-09 ENCOUNTER — HOSPITAL ENCOUNTER (OUTPATIENT)
Dept: NURSING | Age: 59
Setting detail: INFUSION SERIES
Discharge: HOME | End: 2019-09-09
Payer: MEDICARE

## 2019-09-09 VITALS
DIASTOLIC BLOOD PRESSURE: 86 MMHG | WEIGHT: 150 LBS | BODY MASS INDEX: 24.99 KG/M2 | SYSTOLIC BLOOD PRESSURE: 147 MMHG | TEMPERATURE: 97.3 F | HEIGHT: 65 IN | HEART RATE: 85 BPM | RESPIRATION RATE: 18 BRPM

## 2019-09-09 DIAGNOSIS — L50.8 OTHER URTICARIA: Primary | ICD-10-CM

## 2019-09-09 PROCEDURE — 96372 THER/PROPH/DIAG INJ SC/IM: CPT

## 2019-09-09 PROCEDURE — 6360000002 HC RX W HCPCS: Performed by: ALLERGY & IMMUNOLOGY

## 2019-09-09 RX ADMIN — OMALIZUMAB 300 MG: 202.5 INJECTION, SOLUTION SUBCUTANEOUS at 10:28

## 2019-09-09 ASSESSMENT — PAIN - FUNCTIONAL ASSESSMENT: PAIN_FUNCTIONAL_ASSESSMENT: 0-10

## 2019-09-30 ENCOUNTER — HOSPITAL ENCOUNTER (OUTPATIENT)
Dept: NURSING | Age: 59
Setting detail: INFUSION SERIES
Discharge: HOME | End: 2019-09-30
Payer: MEDICARE

## 2019-09-30 VITALS
TEMPERATURE: 97.7 F | BODY MASS INDEX: 24.99 KG/M2 | DIASTOLIC BLOOD PRESSURE: 73 MMHG | HEIGHT: 65 IN | WEIGHT: 150 LBS | SYSTOLIC BLOOD PRESSURE: 131 MMHG | RESPIRATION RATE: 18 BRPM | HEART RATE: 93 BPM

## 2019-09-30 DIAGNOSIS — L50.8 OTHER URTICARIA: Primary | ICD-10-CM

## 2019-09-30 PROCEDURE — 96372 THER/PROPH/DIAG INJ SC/IM: CPT

## 2019-09-30 PROCEDURE — 6360000002 HC RX W HCPCS: Performed by: ALLERGY & IMMUNOLOGY

## 2019-09-30 RX ADMIN — OMALIZUMAB 300 MG: 202.5 INJECTION, SOLUTION SUBCUTANEOUS at 10:30

## 2019-09-30 ASSESSMENT — PAIN - FUNCTIONAL ASSESSMENT: PAIN_FUNCTIONAL_ASSESSMENT: 0-10

## 2019-10-21 ENCOUNTER — HOSPITAL ENCOUNTER (OUTPATIENT)
Dept: NURSING | Age: 59
Setting detail: INFUSION SERIES
Discharge: HOME | End: 2019-10-21
Payer: MEDICARE

## 2019-10-21 VITALS
HEIGHT: 65 IN | BODY MASS INDEX: 24.99 KG/M2 | TEMPERATURE: 97.3 F | HEART RATE: 86 BPM | WEIGHT: 150 LBS | RESPIRATION RATE: 18 BRPM | DIASTOLIC BLOOD PRESSURE: 86 MMHG | SYSTOLIC BLOOD PRESSURE: 135 MMHG

## 2019-10-21 DIAGNOSIS — L50.8 OTHER URTICARIA: Primary | ICD-10-CM

## 2019-10-21 PROCEDURE — 96372 THER/PROPH/DIAG INJ SC/IM: CPT

## 2019-10-21 PROCEDURE — 6360000002 HC RX W HCPCS: Performed by: INTERNAL MEDICINE

## 2019-10-21 PROCEDURE — 2580000003 HC RX 258: Performed by: INTERNAL MEDICINE

## 2019-10-21 RX ADMIN — OMALIZUMAB 300 MG: 202.5 INJECTION, SOLUTION SUBCUTANEOUS at 10:44

## 2019-10-21 ASSESSMENT — PAIN - FUNCTIONAL ASSESSMENT: PAIN_FUNCTIONAL_ASSESSMENT: 0-10

## 2019-11-12 ENCOUNTER — HOSPITAL ENCOUNTER (OUTPATIENT)
Dept: NURSING | Age: 59
Setting detail: INFUSION SERIES
Discharge: HOME | End: 2019-11-12
Payer: MEDICARE

## 2019-11-12 VITALS
WEIGHT: 150 LBS | HEIGHT: 65 IN | HEART RATE: 97 BPM | DIASTOLIC BLOOD PRESSURE: 93 MMHG | SYSTOLIC BLOOD PRESSURE: 143 MMHG | RESPIRATION RATE: 18 BRPM | BODY MASS INDEX: 24.99 KG/M2 | TEMPERATURE: 97.3 F

## 2019-11-12 DIAGNOSIS — L50.8 OTHER URTICARIA: Primary | ICD-10-CM

## 2019-11-12 PROCEDURE — 6360000002 HC RX W HCPCS: Performed by: INTERNAL MEDICINE

## 2019-11-12 PROCEDURE — 96372 THER/PROPH/DIAG INJ SC/IM: CPT

## 2019-11-12 PROCEDURE — 2580000003 HC RX 258: Performed by: INTERNAL MEDICINE

## 2019-11-12 RX ADMIN — OMALIZUMAB 300 MG: 202.5 INJECTION, SOLUTION SUBCUTANEOUS at 10:30

## 2019-11-12 ASSESSMENT — PAIN - FUNCTIONAL ASSESSMENT: PAIN_FUNCTIONAL_ASSESSMENT: 0-10

## 2019-12-02 ENCOUNTER — HOSPITAL ENCOUNTER (OUTPATIENT)
Dept: NURSING | Age: 59
Setting detail: INFUSION SERIES
Discharge: HOME | End: 2019-12-02
Payer: MEDICARE

## 2019-12-02 VITALS
HEIGHT: 65 IN | BODY MASS INDEX: 24.99 KG/M2 | HEART RATE: 87 BPM | DIASTOLIC BLOOD PRESSURE: 83 MMHG | RESPIRATION RATE: 18 BRPM | SYSTOLIC BLOOD PRESSURE: 130 MMHG | WEIGHT: 150 LBS | TEMPERATURE: 97.9 F

## 2019-12-02 DIAGNOSIS — L50.8 OTHER URTICARIA: Primary | ICD-10-CM

## 2019-12-02 PROCEDURE — 96372 THER/PROPH/DIAG INJ SC/IM: CPT

## 2019-12-02 PROCEDURE — 2580000003 HC RX 258: Performed by: INTERNAL MEDICINE

## 2019-12-02 PROCEDURE — 6360000002 HC RX W HCPCS: Performed by: INTERNAL MEDICINE

## 2019-12-02 RX ADMIN — OMALIZUMAB 300 MG: 202.5 INJECTION, SOLUTION SUBCUTANEOUS at 10:17

## 2019-12-02 ASSESSMENT — PAIN - FUNCTIONAL ASSESSMENT: PAIN_FUNCTIONAL_ASSESSMENT: 0-10

## 2019-12-23 ENCOUNTER — HOSPITAL ENCOUNTER (OUTPATIENT)
Dept: NURSING | Age: 59
Setting detail: INFUSION SERIES
Discharge: HOME | End: 2019-12-23
Payer: MEDICARE

## 2019-12-23 VITALS
RESPIRATION RATE: 18 BRPM | TEMPERATURE: 98.1 F | SYSTOLIC BLOOD PRESSURE: 140 MMHG | HEIGHT: 65 IN | HEART RATE: 87 BPM | WEIGHT: 150 LBS | BODY MASS INDEX: 24.99 KG/M2 | DIASTOLIC BLOOD PRESSURE: 86 MMHG

## 2019-12-23 DIAGNOSIS — L50.8 OTHER URTICARIA: Primary | ICD-10-CM

## 2019-12-23 PROCEDURE — 6360000002 HC RX W HCPCS: Performed by: INTERNAL MEDICINE

## 2019-12-23 PROCEDURE — 96372 THER/PROPH/DIAG INJ SC/IM: CPT

## 2019-12-23 PROCEDURE — 2580000003 HC RX 258: Performed by: INTERNAL MEDICINE

## 2019-12-23 RX ADMIN — OMALIZUMAB 300 MG: 202.5 INJECTION, SOLUTION SUBCUTANEOUS at 10:24

## 2019-12-23 ASSESSMENT — PAIN - FUNCTIONAL ASSESSMENT: PAIN_FUNCTIONAL_ASSESSMENT: 0-10

## 2020-01-13 ENCOUNTER — APPOINTMENT (OUTPATIENT)
Dept: NURSING | Age: 60
End: 2020-01-13
Payer: MEDICARE

## 2020-01-14 ENCOUNTER — HOSPITAL ENCOUNTER (OUTPATIENT)
Dept: NURSING | Age: 60
Setting detail: INFUSION SERIES
Discharge: HOME | End: 2020-01-14
Payer: MEDICARE

## 2020-01-14 VITALS
WEIGHT: 150 LBS | TEMPERATURE: 97.1 F | HEART RATE: 80 BPM | HEIGHT: 65 IN | BODY MASS INDEX: 24.99 KG/M2 | DIASTOLIC BLOOD PRESSURE: 81 MMHG | RESPIRATION RATE: 16 BRPM | SYSTOLIC BLOOD PRESSURE: 135 MMHG

## 2020-01-14 DIAGNOSIS — L50.8 OTHER URTICARIA: Primary | ICD-10-CM

## 2020-01-14 PROCEDURE — 6360000002 HC RX W HCPCS: Performed by: INTERNAL MEDICINE

## 2020-01-14 PROCEDURE — 2580000003 HC RX 258: Performed by: INTERNAL MEDICINE

## 2020-01-14 PROCEDURE — 96372 THER/PROPH/DIAG INJ SC/IM: CPT

## 2020-01-14 RX ADMIN — OMALIZUMAB 300 MG: 202.5 INJECTION, SOLUTION SUBCUTANEOUS at 09:52

## 2020-01-14 ASSESSMENT — PAIN - FUNCTIONAL ASSESSMENT: PAIN_FUNCTIONAL_ASSESSMENT: 0-10

## 2020-02-03 ENCOUNTER — HOSPITAL ENCOUNTER (OUTPATIENT)
Dept: NURSING | Age: 60
Setting detail: INFUSION SERIES
Discharge: HOME | End: 2020-02-03
Payer: MEDICARE

## 2020-02-03 VITALS
HEIGHT: 65 IN | DIASTOLIC BLOOD PRESSURE: 87 MMHG | SYSTOLIC BLOOD PRESSURE: 135 MMHG | OXYGEN SATURATION: 97 % | WEIGHT: 150 LBS | BODY MASS INDEX: 24.99 KG/M2 | HEART RATE: 80 BPM | TEMPERATURE: 97.6 F | RESPIRATION RATE: 16 BRPM

## 2020-02-03 DIAGNOSIS — L50.8 OTHER URTICARIA: Primary | ICD-10-CM

## 2020-02-03 PROCEDURE — 96372 THER/PROPH/DIAG INJ SC/IM: CPT

## 2020-02-03 PROCEDURE — 6360000002 HC RX W HCPCS: Performed by: INTERNAL MEDICINE

## 2020-02-03 RX ADMIN — OMALIZUMAB 300 MG: 150 INJECTION, SOLUTION SUBCUTANEOUS at 10:40

## 2020-02-24 ENCOUNTER — HOSPITAL ENCOUNTER (OUTPATIENT)
Dept: NURSING | Age: 60
Setting detail: INFUSION SERIES
Discharge: HOME | End: 2020-02-24
Payer: MEDICARE

## 2020-02-24 VITALS
TEMPERATURE: 97.8 F | RESPIRATION RATE: 16 BRPM | WEIGHT: 150 LBS | HEIGHT: 65 IN | HEART RATE: 90 BPM | BODY MASS INDEX: 24.99 KG/M2 | SYSTOLIC BLOOD PRESSURE: 126 MMHG | DIASTOLIC BLOOD PRESSURE: 82 MMHG

## 2020-02-24 DIAGNOSIS — L50.8 OTHER URTICARIA: Primary | ICD-10-CM

## 2020-02-24 PROCEDURE — 96372 THER/PROPH/DIAG INJ SC/IM: CPT

## 2020-02-24 PROCEDURE — 6360000002 HC RX W HCPCS: Performed by: INTERNAL MEDICINE

## 2020-02-24 RX ADMIN — OMALIZUMAB 300 MG: 150 INJECTION, SOLUTION SUBCUTANEOUS at 10:03

## 2020-02-24 ASSESSMENT — PAIN - FUNCTIONAL ASSESSMENT: PAIN_FUNCTIONAL_ASSESSMENT: 0-10

## 2020-03-16 ENCOUNTER — HOSPITAL ENCOUNTER (OUTPATIENT)
Dept: NURSING | Age: 60
Setting detail: INFUSION SERIES
Discharge: HOME | End: 2020-03-16
Payer: MEDICARE

## 2020-03-16 VITALS
HEIGHT: 65 IN | BODY MASS INDEX: 24.99 KG/M2 | RESPIRATION RATE: 16 BRPM | SYSTOLIC BLOOD PRESSURE: 154 MMHG | HEART RATE: 87 BPM | DIASTOLIC BLOOD PRESSURE: 106 MMHG | WEIGHT: 150 LBS | TEMPERATURE: 97.2 F

## 2020-03-16 DIAGNOSIS — L50.8 OTHER URTICARIA: Primary | ICD-10-CM

## 2020-03-16 PROCEDURE — 6360000002 HC RX W HCPCS: Performed by: INTERNAL MEDICINE

## 2020-03-16 PROCEDURE — 96372 THER/PROPH/DIAG INJ SC/IM: CPT

## 2020-03-16 RX ADMIN — OMALIZUMAB 300 MG: 150 INJECTION, SOLUTION SUBCUTANEOUS at 10:35

## 2020-03-16 ASSESSMENT — PAIN - FUNCTIONAL ASSESSMENT: PAIN_FUNCTIONAL_ASSESSMENT: 0-10

## 2020-04-06 ENCOUNTER — HOSPITAL ENCOUNTER (OUTPATIENT)
Dept: NURSING | Age: 60
Setting detail: INFUSION SERIES
Discharge: HOME | End: 2020-04-06
Payer: MEDICARE

## 2020-04-06 VITALS
HEIGHT: 65 IN | WEIGHT: 150 LBS | RESPIRATION RATE: 16 BRPM | TEMPERATURE: 96.8 F | HEART RATE: 99 BPM | SYSTOLIC BLOOD PRESSURE: 143 MMHG | DIASTOLIC BLOOD PRESSURE: 96 MMHG | BODY MASS INDEX: 24.99 KG/M2

## 2020-04-06 DIAGNOSIS — L50.8 OTHER URTICARIA: Primary | ICD-10-CM

## 2020-04-06 PROCEDURE — 96372 THER/PROPH/DIAG INJ SC/IM: CPT

## 2020-04-06 PROCEDURE — 6360000002 HC RX W HCPCS: Performed by: INTERNAL MEDICINE

## 2020-04-06 RX ADMIN — OMALIZUMAB 300 MG: 150 INJECTION, SOLUTION SUBCUTANEOUS at 10:16

## 2020-04-06 ASSESSMENT — PAIN - FUNCTIONAL ASSESSMENT: PAIN_FUNCTIONAL_ASSESSMENT: 0-10

## 2020-04-27 ENCOUNTER — HOSPITAL ENCOUNTER (OUTPATIENT)
Dept: NURSING | Age: 60
Setting detail: INFUSION SERIES
Discharge: HOME | End: 2020-04-27
Payer: MEDICARE

## 2020-04-27 VITALS
SYSTOLIC BLOOD PRESSURE: 131 MMHG | RESPIRATION RATE: 18 BRPM | HEART RATE: 78 BPM | WEIGHT: 150 LBS | HEIGHT: 65 IN | DIASTOLIC BLOOD PRESSURE: 79 MMHG | TEMPERATURE: 97.4 F | BODY MASS INDEX: 24.99 KG/M2

## 2020-04-27 DIAGNOSIS — L50.8 OTHER URTICARIA: Primary | ICD-10-CM

## 2020-04-27 PROCEDURE — 6360000002 HC RX W HCPCS: Performed by: INTERNAL MEDICINE

## 2020-04-27 PROCEDURE — 96372 THER/PROPH/DIAG INJ SC/IM: CPT

## 2020-04-27 RX ADMIN — OMALIZUMAB 300 MG: 150 INJECTION, SOLUTION SUBCUTANEOUS at 10:12

## 2020-04-27 ASSESSMENT — PAIN - FUNCTIONAL ASSESSMENT: PAIN_FUNCTIONAL_ASSESSMENT: 0-10

## 2020-05-18 ENCOUNTER — HOSPITAL ENCOUNTER (OUTPATIENT)
Dept: NURSING | Age: 60
Setting detail: INFUSION SERIES
Discharge: HOME | End: 2020-05-18
Payer: MEDICARE

## 2020-05-18 VITALS
HEIGHT: 65 IN | RESPIRATION RATE: 18 BRPM | BODY MASS INDEX: 24.99 KG/M2 | HEART RATE: 87 BPM | SYSTOLIC BLOOD PRESSURE: 124 MMHG | DIASTOLIC BLOOD PRESSURE: 82 MMHG | WEIGHT: 150 LBS | TEMPERATURE: 96.2 F

## 2020-05-18 DIAGNOSIS — L50.8 OTHER URTICARIA: Primary | ICD-10-CM

## 2020-05-18 PROCEDURE — 96372 THER/PROPH/DIAG INJ SC/IM: CPT

## 2020-05-18 PROCEDURE — 6360000002 HC RX W HCPCS: Performed by: INTERNAL MEDICINE

## 2020-05-18 PROCEDURE — 2580000003 HC RX 258: Performed by: INTERNAL MEDICINE

## 2020-05-18 RX ADMIN — OMALIZUMAB 300 MG: 202.5 INJECTION, SOLUTION SUBCUTANEOUS at 10:13

## 2020-05-18 ASSESSMENT — PAIN - FUNCTIONAL ASSESSMENT: PAIN_FUNCTIONAL_ASSESSMENT: 0-10

## 2020-06-08 ENCOUNTER — HOSPITAL ENCOUNTER (OUTPATIENT)
Dept: NURSING | Age: 60
Setting detail: INFUSION SERIES
Discharge: HOME | End: 2020-06-08
Payer: MEDICARE

## 2020-06-08 VITALS
TEMPERATURE: 97.7 F | RESPIRATION RATE: 18 BRPM | DIASTOLIC BLOOD PRESSURE: 99 MMHG | HEIGHT: 65 IN | HEART RATE: 97 BPM | BODY MASS INDEX: 24.99 KG/M2 | WEIGHT: 150 LBS | SYSTOLIC BLOOD PRESSURE: 142 MMHG

## 2020-06-08 DIAGNOSIS — L50.8 OTHER URTICARIA: Primary | ICD-10-CM

## 2020-06-08 PROCEDURE — 6360000002 HC RX W HCPCS: Performed by: ALLERGY & IMMUNOLOGY

## 2020-06-08 PROCEDURE — 96372 THER/PROPH/DIAG INJ SC/IM: CPT

## 2020-06-08 RX ADMIN — OMALIZUMAB 300 MG: 150 INJECTION, SOLUTION SUBCUTANEOUS at 10:20

## 2020-06-08 ASSESSMENT — PAIN - FUNCTIONAL ASSESSMENT: PAIN_FUNCTIONAL_ASSESSMENT: 0-10

## 2020-07-01 ENCOUNTER — HOSPITAL ENCOUNTER (OUTPATIENT)
Dept: NURSING | Age: 60
Setting detail: INFUSION SERIES
Discharge: HOME | End: 2020-07-01
Payer: MEDICARE

## 2020-07-01 VITALS
TEMPERATURE: 96.4 F | RESPIRATION RATE: 18 BRPM | BODY MASS INDEX: 24.99 KG/M2 | HEART RATE: 85 BPM | SYSTOLIC BLOOD PRESSURE: 150 MMHG | DIASTOLIC BLOOD PRESSURE: 90 MMHG | WEIGHT: 150 LBS | HEIGHT: 65 IN

## 2020-07-01 DIAGNOSIS — L50.8 OTHER URTICARIA: Primary | ICD-10-CM

## 2020-07-01 PROCEDURE — 6360000002 HC RX W HCPCS: Performed by: INTERNAL MEDICINE

## 2020-07-01 PROCEDURE — 96372 THER/PROPH/DIAG INJ SC/IM: CPT

## 2020-07-01 RX ADMIN — OMALIZUMAB 300 MG: 150 INJECTION, SOLUTION SUBCUTANEOUS at 10:10

## 2020-07-01 ASSESSMENT — PAIN - FUNCTIONAL ASSESSMENT: PAIN_FUNCTIONAL_ASSESSMENT: 0-10

## 2020-07-20 ENCOUNTER — HOSPITAL ENCOUNTER (OUTPATIENT)
Dept: NURSING | Age: 60
Setting detail: INFUSION SERIES
Discharge: HOME | End: 2020-07-20
Payer: MEDICARE

## 2020-07-20 VITALS
DIASTOLIC BLOOD PRESSURE: 83 MMHG | TEMPERATURE: 97.9 F | WEIGHT: 150 LBS | RESPIRATION RATE: 18 BRPM | BODY MASS INDEX: 24.99 KG/M2 | SYSTOLIC BLOOD PRESSURE: 128 MMHG | OXYGEN SATURATION: 99 % | HEIGHT: 65 IN | HEART RATE: 85 BPM

## 2020-07-20 DIAGNOSIS — L50.8 OTHER URTICARIA: Primary | ICD-10-CM

## 2020-07-20 PROCEDURE — 6360000002 HC RX W HCPCS: Performed by: INTERNAL MEDICINE

## 2020-07-20 PROCEDURE — 96372 THER/PROPH/DIAG INJ SC/IM: CPT

## 2020-07-20 RX ADMIN — OMALIZUMAB 300 MG: 150 INJECTION, SOLUTION SUBCUTANEOUS at 10:09

## 2020-07-20 ASSESSMENT — PAIN SCALES - GENERAL: PAINLEVEL_OUTOF10: 0

## 2020-08-10 ENCOUNTER — HOSPITAL ENCOUNTER (OUTPATIENT)
Dept: NURSING | Age: 60
Setting detail: INFUSION SERIES
Discharge: HOME | End: 2020-08-10
Payer: MEDICARE

## 2020-08-10 VITALS
TEMPERATURE: 97.6 F | DIASTOLIC BLOOD PRESSURE: 80 MMHG | HEART RATE: 93 BPM | RESPIRATION RATE: 16 BRPM | WEIGHT: 150 LBS | SYSTOLIC BLOOD PRESSURE: 130 MMHG | BODY MASS INDEX: 24.99 KG/M2 | HEIGHT: 65 IN

## 2020-08-10 DIAGNOSIS — L50.8 OTHER URTICARIA: Primary | ICD-10-CM

## 2020-08-10 PROCEDURE — 96372 THER/PROPH/DIAG INJ SC/IM: CPT

## 2020-08-10 PROCEDURE — 6360000002 HC RX W HCPCS: Performed by: INTERNAL MEDICINE

## 2020-08-10 RX ADMIN — OMALIZUMAB 300 MG: 150 INJECTION, SOLUTION SUBCUTANEOUS at 10:19

## 2020-08-10 ASSESSMENT — PAIN - FUNCTIONAL ASSESSMENT: PAIN_FUNCTIONAL_ASSESSMENT: 0-10

## 2020-08-10 NOTE — PROGRESS NOTES
Pt tolerates injections well discharged to home in stable condition patient has epipen to use if needed

## 2020-08-31 ENCOUNTER — HOSPITAL ENCOUNTER (OUTPATIENT)
Dept: NURSING | Age: 60
Setting detail: INFUSION SERIES
Discharge: HOME | End: 2020-08-31
Payer: MEDICARE

## 2020-08-31 VITALS
BODY MASS INDEX: 24.99 KG/M2 | RESPIRATION RATE: 20 BRPM | DIASTOLIC BLOOD PRESSURE: 84 MMHG | HEART RATE: 79 BPM | WEIGHT: 150 LBS | TEMPERATURE: 97.4 F | SYSTOLIC BLOOD PRESSURE: 147 MMHG | HEIGHT: 65 IN

## 2020-08-31 DIAGNOSIS — L50.8 OTHER URTICARIA: Primary | ICD-10-CM

## 2020-08-31 PROCEDURE — 96372 THER/PROPH/DIAG INJ SC/IM: CPT

## 2020-08-31 PROCEDURE — 6360000002 HC RX W HCPCS: Performed by: INTERNAL MEDICINE

## 2020-08-31 RX ADMIN — OMALIZUMAB 300 MG: 150 INJECTION, SOLUTION SUBCUTANEOUS at 10:23

## 2020-08-31 ASSESSMENT — PAIN - FUNCTIONAL ASSESSMENT: PAIN_FUNCTIONAL_ASSESSMENT: 0-10

## 2020-09-22 ENCOUNTER — HOSPITAL ENCOUNTER (OUTPATIENT)
Dept: NURSING | Age: 60
Setting detail: INFUSION SERIES
Discharge: HOME | End: 2020-09-22
Payer: MEDICARE

## 2020-09-22 VITALS
RESPIRATION RATE: 16 BRPM | HEIGHT: 65 IN | WEIGHT: 155 LBS | DIASTOLIC BLOOD PRESSURE: 84 MMHG | SYSTOLIC BLOOD PRESSURE: 141 MMHG | HEART RATE: 77 BPM | BODY MASS INDEX: 25.83 KG/M2 | TEMPERATURE: 97.9 F

## 2020-09-22 DIAGNOSIS — L50.8 OTHER URTICARIA: Primary | ICD-10-CM

## 2020-09-22 PROCEDURE — 96372 THER/PROPH/DIAG INJ SC/IM: CPT

## 2020-09-22 PROCEDURE — 2580000003 HC RX 258: Performed by: ALLERGY & IMMUNOLOGY

## 2020-09-22 PROCEDURE — 6360000002 HC RX W HCPCS: Performed by: ALLERGY & IMMUNOLOGY

## 2020-09-22 RX ADMIN — OMALIZUMAB 300 MG: 202.5 INJECTION, SOLUTION SUBCUTANEOUS at 10:23

## 2020-09-22 ASSESSMENT — PAIN - FUNCTIONAL ASSESSMENT: PAIN_FUNCTIONAL_ASSESSMENT: 0-10

## 2020-10-12 ENCOUNTER — HOSPITAL ENCOUNTER (OUTPATIENT)
Dept: NURSING | Age: 60
Setting detail: INFUSION SERIES
Discharge: HOME | End: 2020-10-12
Payer: MEDICARE

## 2020-10-12 VITALS
RESPIRATION RATE: 18 BRPM | TEMPERATURE: 97.5 F | SYSTOLIC BLOOD PRESSURE: 126 MMHG | HEART RATE: 85 BPM | BODY MASS INDEX: 24.99 KG/M2 | WEIGHT: 150 LBS | HEIGHT: 65 IN | DIASTOLIC BLOOD PRESSURE: 80 MMHG | OXYGEN SATURATION: 98 %

## 2020-10-12 DIAGNOSIS — L50.8 OTHER URTICARIA: Primary | ICD-10-CM

## 2020-10-12 PROCEDURE — 6360000002 HC RX W HCPCS: Performed by: ALLERGY & IMMUNOLOGY

## 2020-10-12 PROCEDURE — 96372 THER/PROPH/DIAG INJ SC/IM: CPT

## 2020-10-12 RX ADMIN — OMALIZUMAB 300 MG: 150 INJECTION, SOLUTION SUBCUTANEOUS at 10:06

## 2020-10-12 ASSESSMENT — PAIN - FUNCTIONAL ASSESSMENT: PAIN_FUNCTIONAL_ASSESSMENT: 0-10

## 2020-11-02 ENCOUNTER — APPOINTMENT (OUTPATIENT)
Dept: NURSING | Age: 60
End: 2020-11-02
Payer: MEDICARE

## 2020-11-03 ENCOUNTER — HOSPITAL ENCOUNTER (OUTPATIENT)
Dept: NURSING | Age: 60
Setting detail: INFUSION SERIES
Discharge: HOME | End: 2020-11-03
Payer: MEDICARE

## 2020-11-03 VITALS
HEART RATE: 99 BPM | DIASTOLIC BLOOD PRESSURE: 83 MMHG | WEIGHT: 150 LBS | BODY MASS INDEX: 24.99 KG/M2 | TEMPERATURE: 97.3 F | HEIGHT: 65 IN | SYSTOLIC BLOOD PRESSURE: 148 MMHG | RESPIRATION RATE: 20 BRPM

## 2020-11-03 DIAGNOSIS — L50.8 OTHER URTICARIA: Primary | ICD-10-CM

## 2020-11-03 PROCEDURE — 6360000002 HC RX W HCPCS: Performed by: ALLERGY & IMMUNOLOGY

## 2020-11-03 PROCEDURE — 96372 THER/PROPH/DIAG INJ SC/IM: CPT

## 2020-11-03 RX ADMIN — OMALIZUMAB 300 MG: 150 INJECTION, SOLUTION SUBCUTANEOUS at 10:02

## 2020-11-03 ASSESSMENT — PAIN - FUNCTIONAL ASSESSMENT: PAIN_FUNCTIONAL_ASSESSMENT: 0-10

## 2020-11-23 ENCOUNTER — HOSPITAL ENCOUNTER (OUTPATIENT)
Dept: NURSING | Age: 60
Setting detail: INFUSION SERIES
Discharge: HOME | End: 2020-11-23
Payer: MEDICARE

## 2020-11-23 VITALS
SYSTOLIC BLOOD PRESSURE: 141 MMHG | RESPIRATION RATE: 18 BRPM | DIASTOLIC BLOOD PRESSURE: 95 MMHG | BODY MASS INDEX: 23.32 KG/M2 | HEART RATE: 85 BPM | TEMPERATURE: 97.1 F | WEIGHT: 140 LBS | HEIGHT: 65 IN

## 2020-11-23 DIAGNOSIS — L50.8 OTHER URTICARIA: Primary | ICD-10-CM

## 2020-11-23 PROCEDURE — 96372 THER/PROPH/DIAG INJ SC/IM: CPT

## 2020-11-23 PROCEDURE — 6360000002 HC RX W HCPCS: Performed by: ALLERGY & IMMUNOLOGY

## 2020-11-23 RX ADMIN — OMALIZUMAB 300 MG: 150 INJECTION, SOLUTION SUBCUTANEOUS at 10:17

## 2020-12-14 ENCOUNTER — HOSPITAL ENCOUNTER (OUTPATIENT)
Dept: NURSING | Age: 60
Setting detail: INFUSION SERIES
Discharge: HOME | End: 2020-12-14
Payer: MEDICARE

## 2020-12-14 VITALS
DIASTOLIC BLOOD PRESSURE: 89 MMHG | SYSTOLIC BLOOD PRESSURE: 151 MMHG | TEMPERATURE: 97.1 F | BODY MASS INDEX: 24.16 KG/M2 | HEART RATE: 82 BPM | HEIGHT: 65 IN | WEIGHT: 145 LBS | RESPIRATION RATE: 16 BRPM

## 2020-12-14 DIAGNOSIS — L50.8 OTHER URTICARIA: Primary | ICD-10-CM

## 2020-12-14 PROCEDURE — 96372 THER/PROPH/DIAG INJ SC/IM: CPT

## 2020-12-14 PROCEDURE — 6360000002 HC RX W HCPCS: Performed by: ALLERGY & IMMUNOLOGY

## 2020-12-14 PROCEDURE — 2580000003 HC RX 258: Performed by: ALLERGY & IMMUNOLOGY

## 2020-12-14 RX ADMIN — OMALIZUMAB 300 MG: 202.5 INJECTION, SOLUTION SUBCUTANEOUS at 10:28

## 2020-12-14 ASSESSMENT — PAIN - FUNCTIONAL ASSESSMENT: PAIN_FUNCTIONAL_ASSESSMENT: 0-10

## 2021-01-04 ENCOUNTER — HOSPITAL ENCOUNTER (OUTPATIENT)
Dept: NURSING | Age: 61
Setting detail: INFUSION SERIES
Discharge: HOME | End: 2021-01-04
Payer: MEDICARE

## 2021-01-04 VITALS
HEART RATE: 81 BPM | SYSTOLIC BLOOD PRESSURE: 138 MMHG | TEMPERATURE: 97.3 F | DIASTOLIC BLOOD PRESSURE: 72 MMHG | RESPIRATION RATE: 20 BRPM

## 2021-01-04 DIAGNOSIS — L50.8 OTHER URTICARIA: Primary | ICD-10-CM

## 2021-01-04 PROCEDURE — 6360000002 HC RX W HCPCS: Performed by: ALLERGY & IMMUNOLOGY

## 2021-01-04 PROCEDURE — 96372 THER/PROPH/DIAG INJ SC/IM: CPT

## 2021-01-04 RX ADMIN — OMALIZUMAB 300 MG: 150 INJECTION, SOLUTION SUBCUTANEOUS at 10:23

## 2021-01-04 ASSESSMENT — PAIN - FUNCTIONAL ASSESSMENT: PAIN_FUNCTIONAL_ASSESSMENT: 0-10

## 2021-01-25 ENCOUNTER — HOSPITAL ENCOUNTER (OUTPATIENT)
Dept: NURSING | Age: 61
Setting detail: INFUSION SERIES
Discharge: HOME | End: 2021-01-25
Payer: MEDICARE

## 2021-01-25 VITALS
HEART RATE: 81 BPM | WEIGHT: 145 LBS | SYSTOLIC BLOOD PRESSURE: 128 MMHG | HEIGHT: 65 IN | BODY MASS INDEX: 24.16 KG/M2 | RESPIRATION RATE: 16 BRPM | TEMPERATURE: 96.8 F | DIASTOLIC BLOOD PRESSURE: 79 MMHG

## 2021-01-25 DIAGNOSIS — L50.8 OTHER URTICARIA: Primary | ICD-10-CM

## 2021-01-25 PROCEDURE — 6360000002 HC RX W HCPCS: Performed by: ALLERGY & IMMUNOLOGY

## 2021-01-25 PROCEDURE — 96372 THER/PROPH/DIAG INJ SC/IM: CPT

## 2021-01-25 RX ADMIN — OMALIZUMAB 300 MG: 150 INJECTION, SOLUTION SUBCUTANEOUS at 10:22

## 2021-01-25 ASSESSMENT — PAIN - FUNCTIONAL ASSESSMENT: PAIN_FUNCTIONAL_ASSESSMENT: 0-10

## 2021-02-15 ENCOUNTER — HOSPITAL ENCOUNTER (OUTPATIENT)
Dept: NURSING | Age: 61
Setting detail: INFUSION SERIES
Discharge: HOME | End: 2021-02-15
Payer: MEDICARE

## 2021-02-15 VITALS
DIASTOLIC BLOOD PRESSURE: 83 MMHG | SYSTOLIC BLOOD PRESSURE: 144 MMHG | RESPIRATION RATE: 20 BRPM | TEMPERATURE: 98.2 F | HEART RATE: 87 BPM

## 2021-02-15 DIAGNOSIS — L50.8 OTHER URTICARIA: Primary | ICD-10-CM

## 2021-02-15 PROCEDURE — 96372 THER/PROPH/DIAG INJ SC/IM: CPT

## 2021-02-15 PROCEDURE — 6360000002 HC RX W HCPCS: Performed by: ALLERGY & IMMUNOLOGY

## 2021-02-15 RX ADMIN — OMALIZUMAB 300 MG: 150 INJECTION, SOLUTION SUBCUTANEOUS at 10:09

## 2021-02-15 ASSESSMENT — PAIN - FUNCTIONAL ASSESSMENT: PAIN_FUNCTIONAL_ASSESSMENT: 0-10

## 2021-03-05 ENCOUNTER — HOSPITAL ENCOUNTER (OUTPATIENT)
Dept: NURSING | Age: 61
Setting detail: INFUSION SERIES
Discharge: HOME | End: 2021-03-05
Payer: MEDICARE

## 2021-03-05 VITALS
SYSTOLIC BLOOD PRESSURE: 134 MMHG | TEMPERATURE: 98.5 F | HEART RATE: 106 BPM | RESPIRATION RATE: 16 BRPM | WEIGHT: 150 LBS | HEIGHT: 65 IN | BODY MASS INDEX: 24.99 KG/M2 | DIASTOLIC BLOOD PRESSURE: 92 MMHG

## 2021-03-05 DIAGNOSIS — L50.8 OTHER URTICARIA: Primary | ICD-10-CM

## 2021-03-05 PROCEDURE — 96372 THER/PROPH/DIAG INJ SC/IM: CPT

## 2021-03-05 PROCEDURE — 6360000002 HC RX W HCPCS: Performed by: NURSE PRACTITIONER

## 2021-03-05 RX ADMIN — OMALIZUMAB 300 MG: 150 INJECTION, SOLUTION SUBCUTANEOUS at 10:22

## 2021-03-05 ASSESSMENT — PAIN - FUNCTIONAL ASSESSMENT: PAIN_FUNCTIONAL_ASSESSMENT: 0-10

## 2021-03-05 NOTE — PROGRESS NOTES
Pt tolerated injections well bilateral arms, bandage applied dry and intact, pt denies any questions, discharged in stable condition

## 2021-03-29 ENCOUNTER — HOSPITAL ENCOUNTER (OUTPATIENT)
Dept: NURSING | Age: 61
Setting detail: INFUSION SERIES
Discharge: HOME | End: 2021-03-29
Payer: MEDICARE

## 2021-03-29 VITALS
BODY MASS INDEX: 24.99 KG/M2 | HEIGHT: 65 IN | TEMPERATURE: 98 F | DIASTOLIC BLOOD PRESSURE: 78 MMHG | SYSTOLIC BLOOD PRESSURE: 138 MMHG | RESPIRATION RATE: 16 BRPM | HEART RATE: 80 BPM | WEIGHT: 150 LBS

## 2021-03-29 DIAGNOSIS — L50.8 OTHER URTICARIA: Primary | ICD-10-CM

## 2021-03-29 PROCEDURE — 6360000002 HC RX W HCPCS: Performed by: ALLERGY & IMMUNOLOGY

## 2021-03-29 PROCEDURE — 96372 THER/PROPH/DIAG INJ SC/IM: CPT

## 2021-03-29 PROCEDURE — 2580000003 HC RX 258: Performed by: ALLERGY & IMMUNOLOGY

## 2021-03-29 RX ADMIN — OMALIZUMAB 300 MG: 202.5 INJECTION, SOLUTION SUBCUTANEOUS at 10:07

## 2021-03-29 ASSESSMENT — PAIN - FUNCTIONAL ASSESSMENT: PAIN_FUNCTIONAL_ASSESSMENT: 0-10

## 2021-03-29 NOTE — PROGRESS NOTES
Pt was given an injection in each arm and lindsey well the patient waited a few min.  And left with all belongings

## 2021-04-19 ENCOUNTER — HOSPITAL ENCOUNTER (OUTPATIENT)
Dept: NURSING | Age: 61
Setting detail: INFUSION SERIES
Discharge: HOME | End: 2021-04-19
Payer: MEDICARE

## 2021-04-19 VITALS
WEIGHT: 150 LBS | SYSTOLIC BLOOD PRESSURE: 140 MMHG | HEIGHT: 65 IN | RESPIRATION RATE: 20 BRPM | HEART RATE: 78 BPM | DIASTOLIC BLOOD PRESSURE: 79 MMHG | TEMPERATURE: 97.3 F | BODY MASS INDEX: 24.99 KG/M2

## 2021-04-19 DIAGNOSIS — L50.8 OTHER URTICARIA: Primary | ICD-10-CM

## 2021-04-19 PROCEDURE — 6360000002 HC RX W HCPCS: Performed by: ALLERGY & IMMUNOLOGY

## 2021-04-19 PROCEDURE — 99211 OFF/OP EST MAY X REQ PHY/QHP: CPT

## 2021-04-19 PROCEDURE — 96372 THER/PROPH/DIAG INJ SC/IM: CPT

## 2021-04-19 RX ADMIN — OMALIZUMAB 300 MG: 150 INJECTION, SOLUTION SUBCUTANEOUS at 10:10

## 2021-04-19 ASSESSMENT — PAIN - FUNCTIONAL ASSESSMENT: PAIN_FUNCTIONAL_ASSESSMENT: 0-10

## 2021-05-10 ENCOUNTER — HOSPITAL ENCOUNTER (OUTPATIENT)
Dept: NURSING | Age: 61
Setting detail: INFUSION SERIES
Discharge: HOME | End: 2021-05-10
Payer: MEDICARE

## 2021-05-10 VITALS
WEIGHT: 150 LBS | RESPIRATION RATE: 16 BRPM | TEMPERATURE: 98.4 F | HEIGHT: 65 IN | HEART RATE: 82 BPM | DIASTOLIC BLOOD PRESSURE: 89 MMHG | BODY MASS INDEX: 24.99 KG/M2 | SYSTOLIC BLOOD PRESSURE: 153 MMHG

## 2021-05-10 DIAGNOSIS — L50.8 OTHER URTICARIA: Primary | ICD-10-CM

## 2021-05-10 PROCEDURE — 6360000002 HC RX W HCPCS: Performed by: ALLERGY & IMMUNOLOGY

## 2021-05-10 PROCEDURE — 99211 OFF/OP EST MAY X REQ PHY/QHP: CPT

## 2021-05-10 PROCEDURE — 96372 THER/PROPH/DIAG INJ SC/IM: CPT

## 2021-05-10 RX ADMIN — OMALIZUMAB 300 MG: 150 INJECTION, SOLUTION SUBCUTANEOUS at 10:13

## 2021-05-10 ASSESSMENT — PAIN - FUNCTIONAL ASSESSMENT: PAIN_FUNCTIONAL_ASSESSMENT: 0-10

## 2021-06-01 ENCOUNTER — HOSPITAL ENCOUNTER (OUTPATIENT)
Dept: NURSING | Age: 61
Setting detail: INFUSION SERIES
Discharge: HOME | End: 2021-06-01
Payer: MEDICARE

## 2021-06-01 VITALS
DIASTOLIC BLOOD PRESSURE: 84 MMHG | OXYGEN SATURATION: 100 % | SYSTOLIC BLOOD PRESSURE: 137 MMHG | BODY MASS INDEX: 24.99 KG/M2 | WEIGHT: 150 LBS | RESPIRATION RATE: 20 BRPM | TEMPERATURE: 98.5 F | HEART RATE: 93 BPM | HEIGHT: 65 IN

## 2021-06-01 DIAGNOSIS — L50.8 OTHER URTICARIA: Primary | ICD-10-CM

## 2021-06-01 PROCEDURE — 6360000002 HC RX W HCPCS: Performed by: ALLERGY & IMMUNOLOGY

## 2021-06-01 PROCEDURE — 96372 THER/PROPH/DIAG INJ SC/IM: CPT

## 2021-06-01 PROCEDURE — 99211 OFF/OP EST MAY X REQ PHY/QHP: CPT

## 2021-06-01 RX ORDER — FAMOTIDINE 20 MG/1
20 TABLET, FILM COATED ORAL DAILY
COMMUNITY

## 2021-06-01 RX ADMIN — OMALIZUMAB 300 MG: 150 INJECTION, SOLUTION SUBCUTANEOUS at 10:12

## 2021-06-01 ASSESSMENT — PAIN - FUNCTIONAL ASSESSMENT: PAIN_FUNCTIONAL_ASSESSMENT: 0-10

## 2021-06-01 NOTE — PROGRESS NOTES
Pt has been receiving injection for 6 years / no reactions / tolerates well   Refuse instructions / states is not staying discharge to home   VSS

## 2021-06-21 ENCOUNTER — HOSPITAL ENCOUNTER (OUTPATIENT)
Dept: NURSING | Age: 61
Setting detail: INFUSION SERIES
Discharge: HOME | End: 2021-06-21
Payer: MEDICARE

## 2021-06-21 VITALS
HEART RATE: 81 BPM | BODY MASS INDEX: 24.99 KG/M2 | TEMPERATURE: 97.1 F | SYSTOLIC BLOOD PRESSURE: 154 MMHG | RESPIRATION RATE: 18 BRPM | DIASTOLIC BLOOD PRESSURE: 84 MMHG | OXYGEN SATURATION: 97 % | WEIGHT: 150 LBS | HEIGHT: 65 IN

## 2021-06-21 DIAGNOSIS — L50.8 OTHER URTICARIA: Primary | ICD-10-CM

## 2021-06-21 PROCEDURE — 6360000002 HC RX W HCPCS: Performed by: ALLERGY & IMMUNOLOGY

## 2021-06-21 PROCEDURE — 99211 OFF/OP EST MAY X REQ PHY/QHP: CPT

## 2021-06-21 PROCEDURE — 96372 THER/PROPH/DIAG INJ SC/IM: CPT

## 2021-06-21 RX ADMIN — OMALIZUMAB 300 MG: 150 INJECTION, SOLUTION SUBCUTANEOUS at 10:15

## 2021-06-21 ASSESSMENT — PAIN - FUNCTIONAL ASSESSMENT: PAIN_FUNCTIONAL_ASSESSMENT: 0-10

## 2021-07-12 ENCOUNTER — HOSPITAL ENCOUNTER (OUTPATIENT)
Dept: NURSING | Age: 61
Setting detail: INFUSION SERIES
Discharge: HOME | End: 2021-07-12
Payer: MEDICARE

## 2021-07-12 VITALS
HEIGHT: 65 IN | HEART RATE: 77 BPM | OXYGEN SATURATION: 98 % | DIASTOLIC BLOOD PRESSURE: 80 MMHG | TEMPERATURE: 97.4 F | WEIGHT: 150 LBS | RESPIRATION RATE: 16 BRPM | BODY MASS INDEX: 24.99 KG/M2 | SYSTOLIC BLOOD PRESSURE: 126 MMHG

## 2021-07-12 DIAGNOSIS — L50.8 OTHER URTICARIA: Primary | ICD-10-CM

## 2021-07-12 PROCEDURE — 96372 THER/PROPH/DIAG INJ SC/IM: CPT

## 2021-07-12 PROCEDURE — 99211 OFF/OP EST MAY X REQ PHY/QHP: CPT

## 2021-07-12 PROCEDURE — 6360000002 HC RX W HCPCS: Performed by: ALLERGY & IMMUNOLOGY

## 2021-07-12 RX ADMIN — OMALIZUMAB 300 MG: 150 INJECTION, SOLUTION SUBCUTANEOUS at 10:27

## 2021-07-12 ASSESSMENT — PAIN - FUNCTIONAL ASSESSMENT: PAIN_FUNCTIONAL_ASSESSMENT: 0-10

## 2021-08-02 ENCOUNTER — HOSPITAL ENCOUNTER (OUTPATIENT)
Dept: NURSING | Age: 61
Setting detail: INFUSION SERIES
Discharge: HOME | End: 2021-08-02
Payer: MEDICARE

## 2021-08-02 VITALS
TEMPERATURE: 97.4 F | SYSTOLIC BLOOD PRESSURE: 144 MMHG | HEART RATE: 87 BPM | RESPIRATION RATE: 18 BRPM | OXYGEN SATURATION: 99 % | DIASTOLIC BLOOD PRESSURE: 83 MMHG | WEIGHT: 150 LBS | BODY MASS INDEX: 24.99 KG/M2 | HEIGHT: 65 IN

## 2021-08-02 DIAGNOSIS — J45.902 SEVERE ASTHMA WITH STATUS ASTHMATICUS, UNSPECIFIED WHETHER PERSISTENT: Primary | ICD-10-CM

## 2021-08-02 PROCEDURE — 6360000002 HC RX W HCPCS: Performed by: ALLERGY & IMMUNOLOGY

## 2021-08-02 PROCEDURE — 96372 THER/PROPH/DIAG INJ SC/IM: CPT

## 2021-08-02 PROCEDURE — 99211 OFF/OP EST MAY X REQ PHY/QHP: CPT

## 2021-08-02 RX ADMIN — OMALIZUMAB 300 MG: 150 INJECTION, SOLUTION SUBCUTANEOUS at 10:14

## 2021-08-02 ASSESSMENT — PAIN - FUNCTIONAL ASSESSMENT: PAIN_FUNCTIONAL_ASSESSMENT: 0-10

## 2021-08-23 ENCOUNTER — HOSPITAL ENCOUNTER (OUTPATIENT)
Dept: NURSING | Age: 61
Setting detail: INFUSION SERIES
Discharge: HOME | End: 2021-08-23
Payer: MEDICARE

## 2021-08-23 VITALS
DIASTOLIC BLOOD PRESSURE: 93 MMHG | SYSTOLIC BLOOD PRESSURE: 138 MMHG | BODY MASS INDEX: 24.99 KG/M2 | OXYGEN SATURATION: 98 % | HEART RATE: 88 BPM | TEMPERATURE: 98.6 F | HEIGHT: 65 IN | WEIGHT: 150 LBS | RESPIRATION RATE: 16 BRPM

## 2021-08-23 DIAGNOSIS — J45.902 SEVERE ASTHMA WITH STATUS ASTHMATICUS, UNSPECIFIED WHETHER PERSISTENT: Primary | ICD-10-CM

## 2021-08-23 PROCEDURE — 6360000002 HC RX W HCPCS: Performed by: ALLERGY & IMMUNOLOGY

## 2021-08-23 PROCEDURE — 99211 OFF/OP EST MAY X REQ PHY/QHP: CPT

## 2021-08-23 PROCEDURE — 96372 THER/PROPH/DIAG INJ SC/IM: CPT

## 2021-08-23 RX ADMIN — OMALIZUMAB 300 MG: 150 INJECTION, SOLUTION SUBCUTANEOUS at 10:48

## 2021-08-23 ASSESSMENT — PAIN - FUNCTIONAL ASSESSMENT: PAIN_FUNCTIONAL_ASSESSMENT: 0-10

## 2021-09-13 ENCOUNTER — HOSPITAL ENCOUNTER (OUTPATIENT)
Dept: NURSING | Age: 61
Setting detail: INFUSION SERIES
Discharge: HOME | End: 2021-09-13
Payer: MEDICARE

## 2021-09-13 VITALS
OXYGEN SATURATION: 97 % | HEIGHT: 65 IN | DIASTOLIC BLOOD PRESSURE: 88 MMHG | BODY MASS INDEX: 24.99 KG/M2 | WEIGHT: 150 LBS | SYSTOLIC BLOOD PRESSURE: 147 MMHG | HEART RATE: 84 BPM | RESPIRATION RATE: 20 BRPM | TEMPERATURE: 98.1 F

## 2021-09-13 DIAGNOSIS — J45.902 SEVERE ASTHMA WITH STATUS ASTHMATICUS, UNSPECIFIED WHETHER PERSISTENT: Primary | ICD-10-CM

## 2021-09-13 PROCEDURE — 99211 OFF/OP EST MAY X REQ PHY/QHP: CPT

## 2021-09-13 PROCEDURE — 96372 THER/PROPH/DIAG INJ SC/IM: CPT

## 2021-09-13 PROCEDURE — 6360000002 HC RX W HCPCS: Performed by: ALLERGY & IMMUNOLOGY

## 2021-09-13 RX ADMIN — OMALIZUMAB 300 MG: 150 INJECTION, SOLUTION SUBCUTANEOUS at 10:32

## 2021-09-13 ASSESSMENT — PAIN SCALES - GENERAL
PAINLEVEL_OUTOF10: 0
PAINLEVEL_OUTOF10: 0

## 2021-09-13 NOTE — PROGRESS NOTES
Pt tolerated xolair injection well- no adverse effects- states has been receiving injections for 6 years and states she has not had a reaction despite highly encouraging her to wait the 30minutes post injection   VSS

## 2021-10-04 ENCOUNTER — HOSPITAL ENCOUNTER (OUTPATIENT)
Dept: NURSING | Age: 61
Setting detail: INFUSION SERIES
Discharge: HOME | End: 2021-10-04
Payer: MEDICARE

## 2021-10-04 VITALS
RESPIRATION RATE: 16 BRPM | HEART RATE: 84 BPM | TEMPERATURE: 97.3 F | BODY MASS INDEX: 24.99 KG/M2 | OXYGEN SATURATION: 97 % | HEIGHT: 65 IN | WEIGHT: 150 LBS | DIASTOLIC BLOOD PRESSURE: 81 MMHG | SYSTOLIC BLOOD PRESSURE: 149 MMHG

## 2021-10-04 DIAGNOSIS — J45.902 SEVERE ASTHMA WITH STATUS ASTHMATICUS, UNSPECIFIED WHETHER PERSISTENT: Primary | ICD-10-CM

## 2021-10-04 PROCEDURE — 99211 OFF/OP EST MAY X REQ PHY/QHP: CPT

## 2021-10-04 PROCEDURE — 6360000002 HC RX W HCPCS: Performed by: ALLERGY & IMMUNOLOGY

## 2021-10-04 PROCEDURE — 96372 THER/PROPH/DIAG INJ SC/IM: CPT

## 2021-10-04 RX ADMIN — OMALIZUMAB 300 MG: 150 INJECTION, SOLUTION SUBCUTANEOUS at 10:18

## 2021-10-04 ASSESSMENT — PAIN - FUNCTIONAL ASSESSMENT: PAIN_FUNCTIONAL_ASSESSMENT: 0-10

## 2021-10-25 ENCOUNTER — HOSPITAL ENCOUNTER (OUTPATIENT)
Dept: NURSING | Age: 61
Setting detail: INFUSION SERIES
Discharge: HOME | End: 2021-10-25
Payer: MEDICARE

## 2021-10-25 VITALS
DIASTOLIC BLOOD PRESSURE: 84 MMHG | HEIGHT: 65 IN | HEART RATE: 87 BPM | OXYGEN SATURATION: 96 % | SYSTOLIC BLOOD PRESSURE: 146 MMHG | RESPIRATION RATE: 18 BRPM | WEIGHT: 140 LBS | TEMPERATURE: 97.2 F | BODY MASS INDEX: 23.32 KG/M2

## 2021-10-25 DIAGNOSIS — J45.902 SEVERE ASTHMA WITH STATUS ASTHMATICUS, UNSPECIFIED WHETHER PERSISTENT: Primary | ICD-10-CM

## 2021-10-25 PROCEDURE — 6360000002 HC RX W HCPCS: Performed by: INTERNAL MEDICINE

## 2021-10-25 PROCEDURE — 96372 THER/PROPH/DIAG INJ SC/IM: CPT

## 2021-10-25 PROCEDURE — 99211 OFF/OP EST MAY X REQ PHY/QHP: CPT

## 2021-10-25 RX ADMIN — OMALIZUMAB 300 MG: 150 INJECTION, SOLUTION SUBCUTANEOUS at 11:05

## 2021-10-25 ASSESSMENT — PAIN - FUNCTIONAL ASSESSMENT: PAIN_FUNCTIONAL_ASSESSMENT: 0-10

## 2021-10-25 NOTE — PROGRESS NOTES
Pt tolerates injection well. Pt has epi pen available. No s/s reaction.   Discharged to home in stable condition

## 2021-11-15 ENCOUNTER — HOSPITAL ENCOUNTER (OUTPATIENT)
Dept: NURSING | Age: 61
Setting detail: INFUSION SERIES
Discharge: HOME | End: 2021-11-15
Payer: MEDICARE

## 2021-11-15 VITALS
DIASTOLIC BLOOD PRESSURE: 87 MMHG | WEIGHT: 145 LBS | HEART RATE: 81 BPM | BODY MASS INDEX: 24.16 KG/M2 | OXYGEN SATURATION: 99 % | SYSTOLIC BLOOD PRESSURE: 135 MMHG | RESPIRATION RATE: 16 BRPM | TEMPERATURE: 97 F | HEIGHT: 65 IN

## 2021-11-15 DIAGNOSIS — J45.902 SEVERE ASTHMA WITH STATUS ASTHMATICUS, UNSPECIFIED WHETHER PERSISTENT: Primary | ICD-10-CM

## 2021-11-15 PROCEDURE — 96372 THER/PROPH/DIAG INJ SC/IM: CPT

## 2021-11-15 PROCEDURE — 99211 OFF/OP EST MAY X REQ PHY/QHP: CPT

## 2021-11-15 PROCEDURE — 6360000002 HC RX W HCPCS: Performed by: ALLERGY & IMMUNOLOGY

## 2021-11-15 RX ADMIN — OMALIZUMAB 300 MG: 150 INJECTION, SOLUTION SUBCUTANEOUS at 10:14

## 2021-11-15 ASSESSMENT — PAIN - FUNCTIONAL ASSESSMENT: PAIN_FUNCTIONAL_ASSESSMENT: 0-10

## 2021-11-29 PROCEDURE — 96372 THER/PROPH/DIAG INJ SC/IM: CPT

## 2021-12-08 ENCOUNTER — HOSPITAL ENCOUNTER (OUTPATIENT)
Dept: NURSING | Age: 61
Setting detail: INFUSION SERIES
Discharge: HOME | End: 2021-12-08
Payer: MEDICARE

## 2021-12-08 VITALS
WEIGHT: 140 LBS | HEART RATE: 80 BPM | DIASTOLIC BLOOD PRESSURE: 82 MMHG | SYSTOLIC BLOOD PRESSURE: 131 MMHG | BODY MASS INDEX: 23.32 KG/M2 | RESPIRATION RATE: 18 BRPM | OXYGEN SATURATION: 98 % | HEIGHT: 65 IN | TEMPERATURE: 97 F

## 2021-12-08 DIAGNOSIS — J45.902 SEVERE ASTHMA WITH STATUS ASTHMATICUS, UNSPECIFIED WHETHER PERSISTENT: Primary | ICD-10-CM

## 2021-12-08 PROCEDURE — 96372 THER/PROPH/DIAG INJ SC/IM: CPT

## 2021-12-08 PROCEDURE — 99211 OFF/OP EST MAY X REQ PHY/QHP: CPT

## 2021-12-08 PROCEDURE — 6360000002 HC RX W HCPCS: Performed by: ALLERGY & IMMUNOLOGY

## 2021-12-08 RX ADMIN — OMALIZUMAB 300 MG: 150 INJECTION, SOLUTION SUBCUTANEOUS at 09:38

## 2021-12-08 ASSESSMENT — PAIN - FUNCTIONAL ASSESSMENT: PAIN_FUNCTIONAL_ASSESSMENT: 0-10

## 2021-12-08 NOTE — PROGRESS NOTES
Pt tolerated Xolair injections well into left and right arms. Discharged ambulatory in baseline good condition.

## 2021-12-27 ENCOUNTER — HOSPITAL ENCOUNTER (OUTPATIENT)
Dept: NURSING | Age: 61
Setting detail: INFUSION SERIES
Discharge: HOME | End: 2021-12-27
Payer: MEDICARE

## 2021-12-27 VITALS
HEIGHT: 65 IN | DIASTOLIC BLOOD PRESSURE: 96 MMHG | HEART RATE: 94 BPM | OXYGEN SATURATION: 98 % | WEIGHT: 145 LBS | SYSTOLIC BLOOD PRESSURE: 147 MMHG | RESPIRATION RATE: 16 BRPM | BODY MASS INDEX: 24.16 KG/M2 | TEMPERATURE: 97.7 F

## 2021-12-27 DIAGNOSIS — J45.902 SEVERE ASTHMA WITH STATUS ASTHMATICUS, UNSPECIFIED WHETHER PERSISTENT: Primary | ICD-10-CM

## 2021-12-27 PROCEDURE — 96372 THER/PROPH/DIAG INJ SC/IM: CPT

## 2021-12-27 PROCEDURE — 6360000002 HC RX W HCPCS: Performed by: ALLERGY & IMMUNOLOGY

## 2021-12-27 PROCEDURE — 99211 OFF/OP EST MAY X REQ PHY/QHP: CPT

## 2021-12-27 RX ADMIN — OMALIZUMAB 300 MG: 150 INJECTION, SOLUTION SUBCUTANEOUS at 10:48

## 2021-12-27 ASSESSMENT — PAIN - FUNCTIONAL ASSESSMENT: PAIN_FUNCTIONAL_ASSESSMENT: 0-10

## 2022-01-17 ENCOUNTER — HOSPITAL ENCOUNTER (OUTPATIENT)
Dept: NURSING | Age: 62
Setting detail: INFUSION SERIES
Discharge: HOME | End: 2022-01-17
Payer: MEDICARE

## 2022-01-17 VITALS
RESPIRATION RATE: 16 BRPM | HEART RATE: 98 BPM | HEIGHT: 65 IN | OXYGEN SATURATION: 99 % | SYSTOLIC BLOOD PRESSURE: 136 MMHG | DIASTOLIC BLOOD PRESSURE: 89 MMHG | TEMPERATURE: 98.9 F | WEIGHT: 145 LBS | BODY MASS INDEX: 24.16 KG/M2

## 2022-01-17 DIAGNOSIS — J45.902 SEVERE ASTHMA WITH STATUS ASTHMATICUS, UNSPECIFIED WHETHER PERSISTENT: Primary | ICD-10-CM

## 2022-01-17 PROCEDURE — 6360000002 HC RX W HCPCS: Performed by: ALLERGY & IMMUNOLOGY

## 2022-01-17 PROCEDURE — 96372 THER/PROPH/DIAG INJ SC/IM: CPT

## 2022-01-17 PROCEDURE — 99211 OFF/OP EST MAY X REQ PHY/QHP: CPT

## 2022-01-17 RX ADMIN — OMALIZUMAB 300 MG: 150 INJECTION, SOLUTION SUBCUTANEOUS at 10:35

## 2022-01-17 ASSESSMENT — PAIN - FUNCTIONAL ASSESSMENT: PAIN_FUNCTIONAL_ASSESSMENT: 0-10

## 2022-01-17 NOTE — PROGRESS NOTES
Pt tolerated bilateral Xolair injections well with no noted side effects. Epipen in purse. Discharged ambulatory in baseline good condition.

## 2022-02-07 ENCOUNTER — HOSPITAL ENCOUNTER (OUTPATIENT)
Dept: NURSING | Age: 62
Setting detail: INFUSION SERIES
Discharge: HOME | End: 2022-02-07
Payer: MEDICARE

## 2022-02-07 VITALS
HEIGHT: 65 IN | DIASTOLIC BLOOD PRESSURE: 84 MMHG | HEART RATE: 81 BPM | BODY MASS INDEX: 24.16 KG/M2 | TEMPERATURE: 96.3 F | WEIGHT: 145 LBS | SYSTOLIC BLOOD PRESSURE: 140 MMHG | RESPIRATION RATE: 16 BRPM | OXYGEN SATURATION: 98 %

## 2022-02-07 DIAGNOSIS — J45.902 SEVERE ASTHMA WITH STATUS ASTHMATICUS, UNSPECIFIED WHETHER PERSISTENT: Primary | ICD-10-CM

## 2022-02-07 PROCEDURE — 2580000003 HC RX 258: Performed by: ALLERGY & IMMUNOLOGY

## 2022-02-07 PROCEDURE — 6360000002 HC RX W HCPCS: Performed by: ALLERGY & IMMUNOLOGY

## 2022-02-07 PROCEDURE — 96372 THER/PROPH/DIAG INJ SC/IM: CPT

## 2022-02-07 PROCEDURE — 99211 OFF/OP EST MAY X REQ PHY/QHP: CPT

## 2022-02-07 RX ADMIN — OMALIZUMAB 300 MG: 202.5 INJECTION, SOLUTION SUBCUTANEOUS at 10:06

## 2022-02-07 ASSESSMENT — PAIN - FUNCTIONAL ASSESSMENT: PAIN_FUNCTIONAL_ASSESSMENT: 0-10

## 2022-02-07 NOTE — PROGRESS NOTES
Pt tolerated Xolair injections well into arms. No side effects noted. Discharged ambulatory in baseline stable condition.

## 2022-02-28 ENCOUNTER — HOSPITAL ENCOUNTER (OUTPATIENT)
Dept: NURSING | Age: 62
Setting detail: INFUSION SERIES
Discharge: HOME | End: 2022-02-28
Payer: MEDICARE

## 2022-02-28 VITALS
OXYGEN SATURATION: 96 % | HEART RATE: 76 BPM | TEMPERATURE: 97.4 F | BODY MASS INDEX: 24.16 KG/M2 | RESPIRATION RATE: 18 BRPM | SYSTOLIC BLOOD PRESSURE: 134 MMHG | HEIGHT: 65 IN | DIASTOLIC BLOOD PRESSURE: 80 MMHG | WEIGHT: 145 LBS

## 2022-02-28 DIAGNOSIS — J45.902 SEVERE ASTHMA WITH STATUS ASTHMATICUS, UNSPECIFIED WHETHER PERSISTENT: Primary | ICD-10-CM

## 2022-02-28 PROCEDURE — 96372 THER/PROPH/DIAG INJ SC/IM: CPT

## 2022-02-28 PROCEDURE — 6360000002 HC RX W HCPCS: Performed by: NURSE PRACTITIONER

## 2022-02-28 PROCEDURE — 99211 OFF/OP EST MAY X REQ PHY/QHP: CPT

## 2022-02-28 RX ADMIN — OMALIZUMAB 300 MG: 150 INJECTION, SOLUTION SUBCUTANEOUS at 10:17

## 2022-02-28 ASSESSMENT — PAIN - FUNCTIONAL ASSESSMENT: PAIN_FUNCTIONAL_ASSESSMENT: 0-10

## 2022-02-28 NOTE — PROGRESS NOTES
Pt tolerates xolair injection well . No side effects. Pt states does not carry epipen with her anymore and has discussed this with her doctor.  Discharged to home  in stable condition

## 2022-03-21 ENCOUNTER — HOSPITAL ENCOUNTER (OUTPATIENT)
Dept: NURSING | Age: 62
Setting detail: INFUSION SERIES
Discharge: HOME | End: 2022-03-21
Payer: MEDICARE

## 2022-03-21 VITALS
TEMPERATURE: 96.8 F | HEART RATE: 101 BPM | DIASTOLIC BLOOD PRESSURE: 98 MMHG | RESPIRATION RATE: 16 BRPM | OXYGEN SATURATION: 98 % | SYSTOLIC BLOOD PRESSURE: 156 MMHG | BODY MASS INDEX: 24.99 KG/M2 | HEIGHT: 65 IN | WEIGHT: 150 LBS

## 2022-03-21 DIAGNOSIS — J45.902 SEVERE ASTHMA WITH STATUS ASTHMATICUS, UNSPECIFIED WHETHER PERSISTENT: Primary | ICD-10-CM

## 2022-03-21 PROCEDURE — 96372 THER/PROPH/DIAG INJ SC/IM: CPT

## 2022-03-21 PROCEDURE — 99211 OFF/OP EST MAY X REQ PHY/QHP: CPT

## 2022-03-21 PROCEDURE — 6360000002 HC RX W HCPCS: Performed by: ALLERGY & IMMUNOLOGY

## 2022-03-21 RX ADMIN — OMALIZUMAB 300 MG: 150 INJECTION, SOLUTION SUBCUTANEOUS at 13:04

## 2022-03-21 ASSESSMENT — PAIN - FUNCTIONAL ASSESSMENT: PAIN_FUNCTIONAL_ASSESSMENT: 0-10

## 2022-04-11 ENCOUNTER — HOSPITAL ENCOUNTER (OUTPATIENT)
Dept: NURSING | Age: 62
Setting detail: INFUSION SERIES
Discharge: HOME | End: 2022-04-11
Payer: MEDICARE

## 2022-04-11 VITALS
BODY MASS INDEX: 24.99 KG/M2 | OXYGEN SATURATION: 99 % | HEIGHT: 65 IN | TEMPERATURE: 97.2 F | WEIGHT: 150 LBS | HEART RATE: 100 BPM | RESPIRATION RATE: 18 BRPM | DIASTOLIC BLOOD PRESSURE: 94 MMHG | SYSTOLIC BLOOD PRESSURE: 149 MMHG

## 2022-04-11 DIAGNOSIS — J45.902 SEVERE ASTHMA WITH STATUS ASTHMATICUS, UNSPECIFIED WHETHER PERSISTENT: Primary | ICD-10-CM

## 2022-04-11 PROCEDURE — 99211 OFF/OP EST MAY X REQ PHY/QHP: CPT

## 2022-04-11 PROCEDURE — 96372 THER/PROPH/DIAG INJ SC/IM: CPT

## 2022-04-11 PROCEDURE — 6360000002 HC RX W HCPCS: Performed by: ALLERGY & IMMUNOLOGY

## 2022-04-11 RX ADMIN — OMALIZUMAB 300 MG: 150 INJECTION, SOLUTION SUBCUTANEOUS at 10:10

## 2022-04-11 ASSESSMENT — PAIN - FUNCTIONAL ASSESSMENT: PAIN_FUNCTIONAL_ASSESSMENT: 0-10

## 2022-05-02 ENCOUNTER — HOSPITAL ENCOUNTER (OUTPATIENT)
Dept: NURSING | Age: 62
Setting detail: INFUSION SERIES
Discharge: HOME | End: 2022-05-02
Payer: MEDICARE

## 2022-05-02 VITALS
HEART RATE: 94 BPM | RESPIRATION RATE: 16 BRPM | SYSTOLIC BLOOD PRESSURE: 140 MMHG | WEIGHT: 145 LBS | OXYGEN SATURATION: 97 % | TEMPERATURE: 97.7 F | BODY MASS INDEX: 24.16 KG/M2 | HEIGHT: 65 IN | DIASTOLIC BLOOD PRESSURE: 94 MMHG

## 2022-05-02 DIAGNOSIS — J45.902 SEVERE ASTHMA WITH STATUS ASTHMATICUS, UNSPECIFIED WHETHER PERSISTENT: Primary | ICD-10-CM

## 2022-05-02 PROCEDURE — 6360000002 HC RX W HCPCS: Performed by: INTERNAL MEDICINE

## 2022-05-02 PROCEDURE — 99211 OFF/OP EST MAY X REQ PHY/QHP: CPT

## 2022-05-02 PROCEDURE — 96372 THER/PROPH/DIAG INJ SC/IM: CPT

## 2022-05-02 RX ADMIN — OMALIZUMAB 300 MG: 150 INJECTION, SOLUTION SUBCUTANEOUS at 10:08

## 2022-05-02 ASSESSMENT — PAIN - FUNCTIONAL ASSESSMENT: PAIN_FUNCTIONAL_ASSESSMENT: NONE - DENIES PAIN

## 2022-05-23 ENCOUNTER — HOSPITAL ENCOUNTER (OUTPATIENT)
Dept: NURSING | Age: 62
Setting detail: INFUSION SERIES
Discharge: HOME | End: 2022-05-23
Payer: MEDICARE

## 2022-05-23 VITALS
RESPIRATION RATE: 16 BRPM | TEMPERATURE: 96.7 F | WEIGHT: 145 LBS | HEIGHT: 65 IN | BODY MASS INDEX: 24.16 KG/M2 | DIASTOLIC BLOOD PRESSURE: 89 MMHG | HEART RATE: 82 BPM | OXYGEN SATURATION: 98 % | SYSTOLIC BLOOD PRESSURE: 146 MMHG

## 2022-05-23 DIAGNOSIS — J45.902 SEVERE ASTHMA WITH STATUS ASTHMATICUS, UNSPECIFIED WHETHER PERSISTENT: Primary | ICD-10-CM

## 2022-05-23 PROCEDURE — 6360000002 HC RX W HCPCS: Performed by: ALLERGY & IMMUNOLOGY

## 2022-05-23 PROCEDURE — 99211 OFF/OP EST MAY X REQ PHY/QHP: CPT

## 2022-05-23 PROCEDURE — 96372 THER/PROPH/DIAG INJ SC/IM: CPT

## 2022-05-23 RX ADMIN — OMALIZUMAB 300 MG: 150 INJECTION, SOLUTION SUBCUTANEOUS at 10:31

## 2022-05-23 ASSESSMENT — PAIN - FUNCTIONAL ASSESSMENT: PAIN_FUNCTIONAL_ASSESSMENT: NONE - DENIES PAIN

## 2022-05-23 NOTE — PROGRESS NOTES
Pt arrived for Xolair injections. Tolerated the injections into each arm well. No bleeding noted. Discharged ambulatory in baseline good condition.

## 2022-06-13 ENCOUNTER — HOSPITAL ENCOUNTER (OUTPATIENT)
Dept: NURSING | Age: 62
Setting detail: INFUSION SERIES
Discharge: HOME | End: 2022-06-13
Payer: MEDICARE

## 2022-06-13 VITALS
TEMPERATURE: 97.2 F | HEART RATE: 90 BPM | DIASTOLIC BLOOD PRESSURE: 84 MMHG | SYSTOLIC BLOOD PRESSURE: 135 MMHG | WEIGHT: 145 LBS | BODY MASS INDEX: 24.16 KG/M2 | OXYGEN SATURATION: 98 % | RESPIRATION RATE: 16 BRPM | HEIGHT: 65 IN

## 2022-06-13 DIAGNOSIS — J45.902 SEVERE ASTHMA WITH STATUS ASTHMATICUS, UNSPECIFIED WHETHER PERSISTENT: Primary | ICD-10-CM

## 2022-06-13 PROCEDURE — 6360000002 HC RX W HCPCS: Performed by: ALLERGY & IMMUNOLOGY

## 2022-06-13 PROCEDURE — 99211 OFF/OP EST MAY X REQ PHY/QHP: CPT

## 2022-06-13 PROCEDURE — 96372 THER/PROPH/DIAG INJ SC/IM: CPT

## 2022-06-13 RX ADMIN — OMALIZUMAB 300 MG: 150 INJECTION, SOLUTION SUBCUTANEOUS at 10:12

## 2022-06-13 ASSESSMENT — PAIN - FUNCTIONAL ASSESSMENT: PAIN_FUNCTIONAL_ASSESSMENT: NONE - DENIES PAIN

## 2022-07-05 ENCOUNTER — HOSPITAL ENCOUNTER (OUTPATIENT)
Dept: NURSING | Age: 62
Setting detail: INFUSION SERIES
Discharge: HOME | End: 2022-07-05
Payer: MEDICARE

## 2022-07-05 VITALS
HEIGHT: 65 IN | RESPIRATION RATE: 16 BRPM | SYSTOLIC BLOOD PRESSURE: 128 MMHG | TEMPERATURE: 97.5 F | WEIGHT: 145 LBS | BODY MASS INDEX: 24.16 KG/M2 | OXYGEN SATURATION: 96 % | DIASTOLIC BLOOD PRESSURE: 82 MMHG | HEART RATE: 82 BPM

## 2022-07-05 DIAGNOSIS — J45.902 SEVERE ASTHMA WITH STATUS ASTHMATICUS, UNSPECIFIED WHETHER PERSISTENT: Primary | ICD-10-CM

## 2022-07-05 PROCEDURE — 96372 THER/PROPH/DIAG INJ SC/IM: CPT

## 2022-07-05 PROCEDURE — 99211 OFF/OP EST MAY X REQ PHY/QHP: CPT

## 2022-07-25 ENCOUNTER — HOSPITAL ENCOUNTER (OUTPATIENT)
Dept: NURSING | Age: 62
Setting detail: INFUSION SERIES
Discharge: HOME | End: 2022-07-25
Payer: MEDICARE

## 2022-07-25 VITALS
RESPIRATION RATE: 16 BRPM | HEIGHT: 65 IN | HEART RATE: 96 BPM | TEMPERATURE: 97.4 F | OXYGEN SATURATION: 99 % | DIASTOLIC BLOOD PRESSURE: 93 MMHG | WEIGHT: 145 LBS | SYSTOLIC BLOOD PRESSURE: 138 MMHG | BODY MASS INDEX: 24.16 KG/M2

## 2022-07-25 DIAGNOSIS — J45.902 SEVERE ASTHMA WITH STATUS ASTHMATICUS, UNSPECIFIED WHETHER PERSISTENT: Primary | ICD-10-CM

## 2022-07-25 PROCEDURE — 96372 THER/PROPH/DIAG INJ SC/IM: CPT

## 2022-07-25 PROCEDURE — 99211 OFF/OP EST MAY X REQ PHY/QHP: CPT

## 2022-07-25 ASSESSMENT — PAIN - FUNCTIONAL ASSESSMENT: PAIN_FUNCTIONAL_ASSESSMENT: NONE - DENIES PAIN

## 2022-08-15 ENCOUNTER — HOSPITAL ENCOUNTER (OUTPATIENT)
Dept: NURSING | Age: 62
Setting detail: INFUSION SERIES
Discharge: HOME | End: 2022-08-15
Payer: MEDICARE

## 2022-08-15 VITALS
HEART RATE: 79 BPM | TEMPERATURE: 96.8 F | SYSTOLIC BLOOD PRESSURE: 136 MMHG | HEIGHT: 65 IN | DIASTOLIC BLOOD PRESSURE: 83 MMHG | RESPIRATION RATE: 18 BRPM | BODY MASS INDEX: 24.16 KG/M2 | OXYGEN SATURATION: 99 % | WEIGHT: 145 LBS

## 2022-08-15 DIAGNOSIS — J45.902 SEVERE ASTHMA WITH STATUS ASTHMATICUS, UNSPECIFIED WHETHER PERSISTENT: Primary | ICD-10-CM

## 2022-08-15 PROCEDURE — 99211 OFF/OP EST MAY X REQ PHY/QHP: CPT

## 2022-08-15 PROCEDURE — 96372 THER/PROPH/DIAG INJ SC/IM: CPT

## 2022-08-15 ASSESSMENT — PAIN - FUNCTIONAL ASSESSMENT: PAIN_FUNCTIONAL_ASSESSMENT: NONE - DENIES PAIN

## 2022-09-06 ENCOUNTER — HOSPITAL ENCOUNTER (OUTPATIENT)
Dept: NURSING | Age: 62
Setting detail: INFUSION SERIES
Discharge: HOME | End: 2022-09-06
Payer: MEDICARE

## 2022-09-06 VITALS
DIASTOLIC BLOOD PRESSURE: 86 MMHG | HEART RATE: 97 BPM | OXYGEN SATURATION: 95 % | SYSTOLIC BLOOD PRESSURE: 140 MMHG | RESPIRATION RATE: 16 BRPM | TEMPERATURE: 97.8 F | HEIGHT: 65 IN | BODY MASS INDEX: 23.32 KG/M2 | WEIGHT: 140 LBS

## 2022-09-06 DIAGNOSIS — J45.902 SEVERE ASTHMA WITH STATUS ASTHMATICUS, UNSPECIFIED WHETHER PERSISTENT: Primary | ICD-10-CM

## 2022-09-06 PROCEDURE — 96372 THER/PROPH/DIAG INJ SC/IM: CPT

## 2022-09-06 PROCEDURE — 99211 OFF/OP EST MAY X REQ PHY/QHP: CPT

## 2022-09-06 PROCEDURE — 6360000002 HC RX W HCPCS: Performed by: ALLERGY & IMMUNOLOGY

## 2022-09-06 RX ADMIN — OMALIZUMAB 300 MG: 150 INJECTION, SOLUTION SUBCUTANEOUS at 10:27

## 2022-09-06 ASSESSMENT — PAIN DESCRIPTION - DESCRIPTORS: DESCRIPTORS: PINS AND NEEDLES

## 2022-09-06 ASSESSMENT — PAIN - FUNCTIONAL ASSESSMENT: PAIN_FUNCTIONAL_ASSESSMENT: 0-10

## 2022-09-26 ENCOUNTER — HOSPITAL ENCOUNTER (OUTPATIENT)
Dept: NURSING | Age: 62
Setting detail: INFUSION SERIES
Discharge: HOME | End: 2022-09-26
Payer: MEDICARE

## 2022-09-26 VITALS
RESPIRATION RATE: 18 BRPM | HEART RATE: 74 BPM | WEIGHT: 140 LBS | DIASTOLIC BLOOD PRESSURE: 77 MMHG | OXYGEN SATURATION: 99 % | SYSTOLIC BLOOD PRESSURE: 138 MMHG | BODY MASS INDEX: 23.32 KG/M2 | TEMPERATURE: 96.7 F | HEIGHT: 65 IN

## 2022-09-26 DIAGNOSIS — J45.902 SEVERE ASTHMA WITH STATUS ASTHMATICUS, UNSPECIFIED WHETHER PERSISTENT: Primary | ICD-10-CM

## 2022-09-26 PROCEDURE — 96372 THER/PROPH/DIAG INJ SC/IM: CPT

## 2022-09-26 PROCEDURE — 6360000002 HC RX W HCPCS: Performed by: ALLERGY & IMMUNOLOGY

## 2022-09-26 PROCEDURE — 99211 OFF/OP EST MAY X REQ PHY/QHP: CPT

## 2022-09-26 RX ADMIN — OMALIZUMAB 300 MG: 150 INJECTION, SOLUTION SUBCUTANEOUS at 10:14

## 2022-09-26 ASSESSMENT — PAIN - FUNCTIONAL ASSESSMENT: PAIN_FUNCTIONAL_ASSESSMENT: NONE - DENIES PAIN

## 2022-10-17 ENCOUNTER — HOSPITAL ENCOUNTER (OUTPATIENT)
Dept: NURSING | Age: 62
Setting detail: INFUSION SERIES
Discharge: HOME | End: 2022-10-17
Payer: MEDICARE

## 2022-10-17 VITALS
OXYGEN SATURATION: 99 % | RESPIRATION RATE: 18 BRPM | WEIGHT: 145 LBS | HEIGHT: 65 IN | BODY MASS INDEX: 24.16 KG/M2 | DIASTOLIC BLOOD PRESSURE: 75 MMHG | SYSTOLIC BLOOD PRESSURE: 137 MMHG | TEMPERATURE: 97.1 F | HEART RATE: 90 BPM

## 2022-10-17 DIAGNOSIS — J45.902 SEVERE ASTHMA WITH STATUS ASTHMATICUS, UNSPECIFIED WHETHER PERSISTENT: Primary | ICD-10-CM

## 2022-10-17 PROCEDURE — 96372 THER/PROPH/DIAG INJ SC/IM: CPT

## 2022-10-17 PROCEDURE — 99211 OFF/OP EST MAY X REQ PHY/QHP: CPT

## 2022-10-17 ASSESSMENT — PAIN - FUNCTIONAL ASSESSMENT: PAIN_FUNCTIONAL_ASSESSMENT: 0-10

## 2022-11-07 ENCOUNTER — HOSPITAL ENCOUNTER (OUTPATIENT)
Dept: NURSING | Age: 62
Setting detail: INFUSION SERIES
Discharge: HOME | End: 2022-11-07
Payer: MEDICARE

## 2022-11-07 VITALS
HEIGHT: 65 IN | WEIGHT: 145 LBS | TEMPERATURE: 97.3 F | SYSTOLIC BLOOD PRESSURE: 121 MMHG | DIASTOLIC BLOOD PRESSURE: 73 MMHG | HEART RATE: 81 BPM | OXYGEN SATURATION: 98 % | RESPIRATION RATE: 18 BRPM | BODY MASS INDEX: 24.16 KG/M2

## 2022-11-07 DIAGNOSIS — J45.902 SEVERE ASTHMA WITH STATUS ASTHMATICUS, UNSPECIFIED WHETHER PERSISTENT: Primary | ICD-10-CM

## 2022-11-07 PROCEDURE — 96372 THER/PROPH/DIAG INJ SC/IM: CPT

## 2022-11-07 PROCEDURE — 99211 OFF/OP EST MAY X REQ PHY/QHP: CPT

## 2022-11-07 ASSESSMENT — PAIN - FUNCTIONAL ASSESSMENT: PAIN_FUNCTIONAL_ASSESSMENT: 0-10

## 2022-11-07 NOTE — PROGRESS NOTES
Pt tolerates injection without any complications. Next appointment made. Refused education.  Discharged to home in stable condition

## 2022-11-28 ENCOUNTER — HOSPITAL ENCOUNTER (OUTPATIENT)
Dept: NURSING | Age: 62
Setting detail: INFUSION SERIES
Discharge: HOME | End: 2022-11-28
Payer: MEDICARE

## 2022-11-28 VITALS
BODY MASS INDEX: 24.16 KG/M2 | DIASTOLIC BLOOD PRESSURE: 71 MMHG | RESPIRATION RATE: 16 BRPM | SYSTOLIC BLOOD PRESSURE: 134 MMHG | HEIGHT: 65 IN | OXYGEN SATURATION: 96 % | WEIGHT: 145 LBS | TEMPERATURE: 96.6 F | HEART RATE: 77 BPM

## 2022-11-28 DIAGNOSIS — J45.902 SEVERE ASTHMA WITH STATUS ASTHMATICUS, UNSPECIFIED WHETHER PERSISTENT: Primary | ICD-10-CM

## 2022-11-28 PROCEDURE — 96372 THER/PROPH/DIAG INJ SC/IM: CPT

## 2022-11-28 PROCEDURE — 99211 OFF/OP EST MAY X REQ PHY/QHP: CPT

## 2022-11-28 ASSESSMENT — PAIN - FUNCTIONAL ASSESSMENT: PAIN_FUNCTIONAL_ASSESSMENT: NONE - DENIES PAIN

## 2022-12-19 ENCOUNTER — HOSPITAL ENCOUNTER (OUTPATIENT)
Dept: NURSING | Age: 62
Setting detail: INFUSION SERIES
Discharge: HOME | End: 2022-12-19
Payer: MEDICARE

## 2022-12-19 VITALS
SYSTOLIC BLOOD PRESSURE: 117 MMHG | RESPIRATION RATE: 18 BRPM | HEIGHT: 65 IN | DIASTOLIC BLOOD PRESSURE: 73 MMHG | BODY MASS INDEX: 24.16 KG/M2 | TEMPERATURE: 97.3 F | HEART RATE: 80 BPM | WEIGHT: 145 LBS | OXYGEN SATURATION: 98 %

## 2022-12-19 DIAGNOSIS — J45.902 SEVERE ASTHMA WITH STATUS ASTHMATICUS, UNSPECIFIED WHETHER PERSISTENT: Primary | ICD-10-CM

## 2022-12-19 PROCEDURE — 99211 OFF/OP EST MAY X REQ PHY/QHP: CPT

## 2022-12-19 PROCEDURE — 96372 THER/PROPH/DIAG INJ SC/IM: CPT

## 2022-12-19 ASSESSMENT — PAIN - FUNCTIONAL ASSESSMENT: PAIN_FUNCTIONAL_ASSESSMENT: 0-10

## 2023-01-09 ENCOUNTER — HOSPITAL ENCOUNTER (OUTPATIENT)
Dept: NURSING | Age: 63
Setting detail: INFUSION SERIES
Discharge: HOME | End: 2023-01-09

## 2023-01-11 ENCOUNTER — HOSPITAL ENCOUNTER (OUTPATIENT)
Dept: NURSING | Age: 63
Setting detail: INFUSION SERIES
Discharge: HOME | End: 2023-01-11
Payer: MEDICARE

## 2023-01-11 VITALS
OXYGEN SATURATION: 98 % | RESPIRATION RATE: 16 BRPM | TEMPERATURE: 96.6 F | WEIGHT: 145 LBS | HEIGHT: 65 IN | DIASTOLIC BLOOD PRESSURE: 86 MMHG | SYSTOLIC BLOOD PRESSURE: 148 MMHG | HEART RATE: 90 BPM | BODY MASS INDEX: 24.16 KG/M2

## 2023-01-11 DIAGNOSIS — J45.902 SEVERE ASTHMA WITH STATUS ASTHMATICUS, UNSPECIFIED WHETHER PERSISTENT: Primary | ICD-10-CM

## 2023-01-11 PROCEDURE — 6360000002 HC RX W HCPCS: Performed by: ALLERGY & IMMUNOLOGY

## 2023-01-11 PROCEDURE — 99211 OFF/OP EST MAY X REQ PHY/QHP: CPT

## 2023-01-11 PROCEDURE — 96372 THER/PROPH/DIAG INJ SC/IM: CPT

## 2023-01-11 RX ADMIN — OMALIZUMAB 300 MG: 150 INJECTION, SOLUTION SUBCUTANEOUS at 10:13

## 2023-01-11 ASSESSMENT — PAIN - FUNCTIONAL ASSESSMENT: PAIN_FUNCTIONAL_ASSESSMENT: NONE - DENIES PAIN

## 2023-01-31 ENCOUNTER — HOSPITAL ENCOUNTER (OUTPATIENT)
Dept: NURSING | Age: 63
Setting detail: INFUSION SERIES
Discharge: HOME | End: 2023-01-31
Payer: MEDICARE

## 2023-01-31 VITALS
RESPIRATION RATE: 16 BRPM | DIASTOLIC BLOOD PRESSURE: 85 MMHG | OXYGEN SATURATION: 99 % | BODY MASS INDEX: 24.16 KG/M2 | HEIGHT: 65 IN | SYSTOLIC BLOOD PRESSURE: 128 MMHG | WEIGHT: 145 LBS | TEMPERATURE: 97.1 F | HEART RATE: 84 BPM

## 2023-01-31 DIAGNOSIS — J45.902 SEVERE ASTHMA WITH STATUS ASTHMATICUS, UNSPECIFIED WHETHER PERSISTENT: Primary | ICD-10-CM

## 2023-01-31 PROCEDURE — 96372 THER/PROPH/DIAG INJ SC/IM: CPT

## 2023-01-31 PROCEDURE — 99211 OFF/OP EST MAY X REQ PHY/QHP: CPT

## 2023-01-31 ASSESSMENT — PAIN - FUNCTIONAL ASSESSMENT: PAIN_FUNCTIONAL_ASSESSMENT: NONE - DENIES PAIN

## 2023-01-31 NOTE — PROGRESS NOTES
Pt tolerates injections well. Follow up appointment made for 3 weeks.  Discharged to home in stable condition

## 2023-02-21 ENCOUNTER — HOSPITAL ENCOUNTER (OUTPATIENT)
Dept: NURSING | Age: 63
Setting detail: INFUSION SERIES
Discharge: HOME | End: 2023-02-21
Payer: MEDICARE

## 2023-02-21 VITALS
TEMPERATURE: 97 F | DIASTOLIC BLOOD PRESSURE: 97 MMHG | WEIGHT: 135 LBS | RESPIRATION RATE: 16 BRPM | BODY MASS INDEX: 22.47 KG/M2 | HEART RATE: 84 BPM | SYSTOLIC BLOOD PRESSURE: 143 MMHG

## 2023-02-21 DIAGNOSIS — J45.902 SEVERE ASTHMA WITH STATUS ASTHMATICUS, UNSPECIFIED WHETHER PERSISTENT: Primary | ICD-10-CM

## 2023-02-21 PROCEDURE — 99211 OFF/OP EST MAY X REQ PHY/QHP: CPT

## 2023-02-21 PROCEDURE — 96372 THER/PROPH/DIAG INJ SC/IM: CPT

## 2023-02-21 ASSESSMENT — PAIN - FUNCTIONAL ASSESSMENT: PAIN_FUNCTIONAL_ASSESSMENT: NONE - DENIES PAIN

## 2023-03-15 ENCOUNTER — HOSPITAL ENCOUNTER (OUTPATIENT)
Dept: NURSING | Age: 63
Setting detail: INFUSION SERIES
Discharge: HOME | End: 2023-03-15
Payer: MEDICARE

## 2023-03-15 VITALS
TEMPERATURE: 97.4 F | OXYGEN SATURATION: 100 % | SYSTOLIC BLOOD PRESSURE: 133 MMHG | RESPIRATION RATE: 18 BRPM | BODY MASS INDEX: 22.49 KG/M2 | HEART RATE: 82 BPM | WEIGHT: 135 LBS | HEIGHT: 65 IN | DIASTOLIC BLOOD PRESSURE: 79 MMHG

## 2023-03-15 DIAGNOSIS — J45.902 SEVERE ASTHMA WITH STATUS ASTHMATICUS, UNSPECIFIED WHETHER PERSISTENT: Primary | ICD-10-CM

## 2023-03-15 PROCEDURE — 96372 THER/PROPH/DIAG INJ SC/IM: CPT

## 2023-03-15 PROCEDURE — 99211 OFF/OP EST MAY X REQ PHY/QHP: CPT

## 2023-03-15 ASSESSMENT — PAIN - FUNCTIONAL ASSESSMENT: PAIN_FUNCTIONAL_ASSESSMENT: 0-10

## 2023-04-04 ENCOUNTER — HOSPITAL ENCOUNTER (OUTPATIENT)
Dept: NURSING | Age: 63
Setting detail: INFUSION SERIES
Discharge: HOME | End: 2023-04-04
Payer: MEDICARE

## 2023-04-04 VITALS
HEIGHT: 65 IN | OXYGEN SATURATION: 98 % | RESPIRATION RATE: 16 BRPM | TEMPERATURE: 98 F | SYSTOLIC BLOOD PRESSURE: 133 MMHG | WEIGHT: 134 LBS | DIASTOLIC BLOOD PRESSURE: 79 MMHG | BODY MASS INDEX: 22.33 KG/M2 | HEART RATE: 82 BPM

## 2023-04-04 DIAGNOSIS — J45.902 SEVERE ASTHMA WITH STATUS ASTHMATICUS, UNSPECIFIED WHETHER PERSISTENT: Primary | ICD-10-CM

## 2023-04-04 PROCEDURE — 99211 OFF/OP EST MAY X REQ PHY/QHP: CPT

## 2023-04-04 PROCEDURE — 96372 THER/PROPH/DIAG INJ SC/IM: CPT

## 2023-04-04 ASSESSMENT — PAIN - FUNCTIONAL ASSESSMENT: PAIN_FUNCTIONAL_ASSESSMENT: NONE - DENIES PAIN

## 2023-04-28 ENCOUNTER — HOSPITAL ENCOUNTER (OUTPATIENT)
Dept: NURSING | Age: 63
Setting detail: INFUSION SERIES
Discharge: HOME | End: 2023-04-28
Payer: MEDICARE

## 2023-04-28 VITALS
OXYGEN SATURATION: 98 % | WEIGHT: 131 LBS | RESPIRATION RATE: 16 BRPM | HEIGHT: 65 IN | DIASTOLIC BLOOD PRESSURE: 79 MMHG | TEMPERATURE: 97 F | SYSTOLIC BLOOD PRESSURE: 140 MMHG | BODY MASS INDEX: 21.83 KG/M2 | HEART RATE: 80 BPM

## 2023-04-28 DIAGNOSIS — J45.902 SEVERE ASTHMA WITH STATUS ASTHMATICUS, UNSPECIFIED WHETHER PERSISTENT: Primary | ICD-10-CM

## 2023-04-28 PROCEDURE — 96372 THER/PROPH/DIAG INJ SC/IM: CPT

## 2023-04-28 PROCEDURE — 99211 OFF/OP EST MAY X REQ PHY/QHP: CPT

## 2023-04-28 ASSESSMENT — PAIN - FUNCTIONAL ASSESSMENT: PAIN_FUNCTIONAL_ASSESSMENT: NONE - DENIES PAIN

## 2023-04-28 NOTE — PROGRESS NOTES
Pt tolerates injections well. Follow up appointment made.  Discharged to home with epipen in purse in stable condition

## 2023-05-22 ENCOUNTER — HOSPITAL ENCOUNTER (OUTPATIENT)
Dept: NURSING | Age: 63
Setting detail: INFUSION SERIES
Discharge: HOME | End: 2023-05-22
Payer: MEDICARE

## 2023-05-22 VITALS
HEIGHT: 65 IN | SYSTOLIC BLOOD PRESSURE: 136 MMHG | DIASTOLIC BLOOD PRESSURE: 86 MMHG | TEMPERATURE: 97.4 F | OXYGEN SATURATION: 97 % | BODY MASS INDEX: 22.33 KG/M2 | HEART RATE: 75 BPM | RESPIRATION RATE: 18 BRPM | WEIGHT: 134 LBS

## 2023-05-22 DIAGNOSIS — J45.902 SEVERE ASTHMA WITH STATUS ASTHMATICUS, UNSPECIFIED WHETHER PERSISTENT: Primary | ICD-10-CM

## 2023-05-22 PROCEDURE — 99211 OFF/OP EST MAY X REQ PHY/QHP: CPT

## 2023-05-22 PROCEDURE — 96372 THER/PROPH/DIAG INJ SC/IM: CPT

## 2023-06-14 ENCOUNTER — HOSPITAL ENCOUNTER (OUTPATIENT)
Dept: NURSING | Age: 63
Setting detail: INFUSION SERIES
Discharge: HOME | End: 2023-06-14
Payer: MEDICARE

## 2023-06-14 VITALS
TEMPERATURE: 98.1 F | WEIGHT: 140 LBS | DIASTOLIC BLOOD PRESSURE: 88 MMHG | BODY MASS INDEX: 23.32 KG/M2 | OXYGEN SATURATION: 96 % | RESPIRATION RATE: 16 BRPM | HEART RATE: 85 BPM | SYSTOLIC BLOOD PRESSURE: 141 MMHG | HEIGHT: 65 IN

## 2023-06-14 DIAGNOSIS — J45.902 SEVERE ASTHMA WITH STATUS ASTHMATICUS, UNSPECIFIED WHETHER PERSISTENT: Primary | ICD-10-CM

## 2023-06-14 PROCEDURE — 99211 OFF/OP EST MAY X REQ PHY/QHP: CPT

## 2023-06-14 PROCEDURE — 96372 THER/PROPH/DIAG INJ SC/IM: CPT

## 2023-06-14 ASSESSMENT — PAIN - FUNCTIONAL ASSESSMENT: PAIN_FUNCTIONAL_ASSESSMENT: NONE - DENIES PAIN

## 2023-07-06 ENCOUNTER — HOSPITAL ENCOUNTER (OUTPATIENT)
Dept: NURSING | Age: 63
Setting detail: INFUSION SERIES
Discharge: HOME | End: 2023-07-06
Payer: MEDICARE

## 2023-07-06 VITALS
OXYGEN SATURATION: 96 % | TEMPERATURE: 97.6 F | HEART RATE: 88 BPM | WEIGHT: 145 LBS | SYSTOLIC BLOOD PRESSURE: 134 MMHG | BODY MASS INDEX: 24.16 KG/M2 | RESPIRATION RATE: 16 BRPM | DIASTOLIC BLOOD PRESSURE: 84 MMHG | HEIGHT: 65 IN

## 2023-07-06 DIAGNOSIS — J45.902 SEVERE ASTHMA WITH STATUS ASTHMATICUS, UNSPECIFIED WHETHER PERSISTENT: Primary | ICD-10-CM

## 2023-07-06 PROCEDURE — 99211 OFF/OP EST MAY X REQ PHY/QHP: CPT

## 2023-07-06 PROCEDURE — 96372 THER/PROPH/DIAG INJ SC/IM: CPT

## 2023-07-06 ASSESSMENT — PAIN - FUNCTIONAL ASSESSMENT: PAIN_FUNCTIONAL_ASSESSMENT: NONE - DENIES PAIN

## 2023-07-28 ENCOUNTER — HOSPITAL ENCOUNTER (OUTPATIENT)
Dept: NURSING | Age: 63
Setting detail: INFUSION SERIES
Discharge: HOME | End: 2023-07-28
Payer: MEDICARE

## 2023-07-28 VITALS
RESPIRATION RATE: 16 BRPM | HEIGHT: 65 IN | TEMPERATURE: 97.2 F | HEART RATE: 77 BPM | WEIGHT: 136 LBS | DIASTOLIC BLOOD PRESSURE: 76 MMHG | SYSTOLIC BLOOD PRESSURE: 146 MMHG | BODY MASS INDEX: 22.66 KG/M2 | OXYGEN SATURATION: 97 %

## 2023-07-28 DIAGNOSIS — J45.902 SEVERE ASTHMA WITH STATUS ASTHMATICUS, UNSPECIFIED WHETHER PERSISTENT: Primary | ICD-10-CM

## 2023-07-28 PROCEDURE — 96372 THER/PROPH/DIAG INJ SC/IM: CPT

## 2023-07-28 PROCEDURE — 99211 OFF/OP EST MAY X REQ PHY/QHP: CPT

## 2023-07-28 ASSESSMENT — PAIN - FUNCTIONAL ASSESSMENT: PAIN_FUNCTIONAL_ASSESSMENT: NONE - DENIES PAIN

## 2023-08-21 ENCOUNTER — HOSPITAL ENCOUNTER (OUTPATIENT)
Dept: NURSING | Age: 63
Setting detail: INFUSION SERIES
Discharge: HOME | End: 2023-08-21
Payer: MEDICARE

## 2023-08-21 VITALS
DIASTOLIC BLOOD PRESSURE: 84 MMHG | SYSTOLIC BLOOD PRESSURE: 128 MMHG | WEIGHT: 136 LBS | TEMPERATURE: 97 F | OXYGEN SATURATION: 99 % | HEIGHT: 65 IN | HEART RATE: 78 BPM | BODY MASS INDEX: 22.66 KG/M2 | RESPIRATION RATE: 16 BRPM

## 2023-08-21 DIAGNOSIS — J45.902 SEVERE ASTHMA WITH STATUS ASTHMATICUS, UNSPECIFIED WHETHER PERSISTENT: Primary | ICD-10-CM

## 2023-08-21 PROCEDURE — 96372 THER/PROPH/DIAG INJ SC/IM: CPT

## 2023-08-21 PROCEDURE — 99211 OFF/OP EST MAY X REQ PHY/QHP: CPT

## 2023-08-21 ASSESSMENT — PAIN - FUNCTIONAL ASSESSMENT: PAIN_FUNCTIONAL_ASSESSMENT: NONE - DENIES PAIN

## 2023-09-11 ENCOUNTER — HOSPITAL ENCOUNTER (OUTPATIENT)
Dept: NURSING | Age: 63
Setting detail: INFUSION SERIES
Discharge: HOME | End: 2023-09-11
Payer: MEDICARE

## 2023-09-11 VITALS
HEART RATE: 79 BPM | TEMPERATURE: 97.6 F | WEIGHT: 134 LBS | BODY MASS INDEX: 22.33 KG/M2 | OXYGEN SATURATION: 96 % | RESPIRATION RATE: 16 BRPM | HEIGHT: 65 IN

## 2023-09-11 DIAGNOSIS — J45.902 SEVERE ASTHMA WITH STATUS ASTHMATICUS, UNSPECIFIED WHETHER PERSISTENT: Primary | ICD-10-CM

## 2023-09-11 PROCEDURE — 99211 OFF/OP EST MAY X REQ PHY/QHP: CPT

## 2023-09-11 PROCEDURE — 96372 THER/PROPH/DIAG INJ SC/IM: CPT

## 2023-09-11 ASSESSMENT — PAIN - FUNCTIONAL ASSESSMENT: PAIN_FUNCTIONAL_ASSESSMENT: 0-10

## 2023-10-02 ENCOUNTER — HOSPITAL ENCOUNTER (OUTPATIENT)
Dept: NURSING | Age: 63
Setting detail: INFUSION SERIES
Discharge: HOME | End: 2023-10-02
Payer: MEDICARE

## 2023-10-02 VITALS
DIASTOLIC BLOOD PRESSURE: 90 MMHG | OXYGEN SATURATION: 100 % | HEART RATE: 110 BPM | BODY MASS INDEX: 22.33 KG/M2 | TEMPERATURE: 97.7 F | WEIGHT: 134 LBS | SYSTOLIC BLOOD PRESSURE: 149 MMHG | RESPIRATION RATE: 16 BRPM | HEIGHT: 65 IN

## 2023-10-02 DIAGNOSIS — J45.902 SEVERE ASTHMA WITH STATUS ASTHMATICUS, UNSPECIFIED WHETHER PERSISTENT: Primary | ICD-10-CM

## 2023-10-02 PROCEDURE — 96372 THER/PROPH/DIAG INJ SC/IM: CPT

## 2023-10-02 PROCEDURE — 99211 OFF/OP EST MAY X REQ PHY/QHP: CPT

## 2023-10-02 ASSESSMENT — PAIN - FUNCTIONAL ASSESSMENT: PAIN_FUNCTIONAL_ASSESSMENT: NONE - DENIES PAIN

## 2023-10-23 ENCOUNTER — HOSPITAL ENCOUNTER (OUTPATIENT)
Dept: NURSING | Age: 63
Setting detail: INFUSION SERIES
Discharge: HOME | End: 2023-10-23
Payer: MEDICARE

## 2023-10-23 VITALS
OXYGEN SATURATION: 96 % | TEMPERATURE: 98.3 F | RESPIRATION RATE: 16 BRPM | DIASTOLIC BLOOD PRESSURE: 58 MMHG | WEIGHT: 134 LBS | HEART RATE: 85 BPM | HEIGHT: 65 IN | BODY MASS INDEX: 22.33 KG/M2 | SYSTOLIC BLOOD PRESSURE: 123 MMHG

## 2023-10-23 DIAGNOSIS — J45.902 SEVERE ASTHMA WITH STATUS ASTHMATICUS, UNSPECIFIED WHETHER PERSISTENT: Primary | ICD-10-CM

## 2023-10-23 PROCEDURE — 96372 THER/PROPH/DIAG INJ SC/IM: CPT

## 2023-10-23 PROCEDURE — 99211 OFF/OP EST MAY X REQ PHY/QHP: CPT

## 2023-10-23 ASSESSMENT — PAIN - FUNCTIONAL ASSESSMENT: PAIN_FUNCTIONAL_ASSESSMENT: 0-10

## 2023-10-23 NOTE — PROGRESS NOTES
Injections given per orders. Pt tolerated well. No complaints. Appointments scheduled, discharged to home.

## 2023-11-13 ENCOUNTER — HOSPITAL ENCOUNTER (OUTPATIENT)
Dept: NURSING | Age: 63
Setting detail: INFUSION SERIES
Discharge: HOME | End: 2023-11-13
Payer: MEDICARE

## 2023-11-13 VITALS
OXYGEN SATURATION: 99 % | TEMPERATURE: 97.5 F | HEIGHT: 65 IN | WEIGHT: 134 LBS | RESPIRATION RATE: 16 BRPM | BODY MASS INDEX: 22.33 KG/M2 | HEART RATE: 84 BPM | SYSTOLIC BLOOD PRESSURE: 120 MMHG | DIASTOLIC BLOOD PRESSURE: 70 MMHG

## 2023-11-13 DIAGNOSIS — L50.8 OTHER URTICARIA: Primary | ICD-10-CM

## 2023-11-13 PROCEDURE — 96372 THER/PROPH/DIAG INJ SC/IM: CPT

## 2023-11-13 PROCEDURE — 99211 OFF/OP EST MAY X REQ PHY/QHP: CPT

## 2023-11-13 ASSESSMENT — PAIN - FUNCTIONAL ASSESSMENT: PAIN_FUNCTIONAL_ASSESSMENT: NONE - DENIES PAIN

## 2023-12-04 ENCOUNTER — HOSPITAL ENCOUNTER (OUTPATIENT)
Dept: NURSING | Age: 63
Setting detail: INFUSION SERIES
Discharge: HOME | End: 2023-12-04
Payer: MEDICARE

## 2023-12-04 VITALS
WEIGHT: 134 LBS | HEART RATE: 84 BPM | HEIGHT: 65 IN | OXYGEN SATURATION: 100 % | DIASTOLIC BLOOD PRESSURE: 77 MMHG | TEMPERATURE: 97.4 F | RESPIRATION RATE: 16 BRPM | BODY MASS INDEX: 22.33 KG/M2 | SYSTOLIC BLOOD PRESSURE: 136 MMHG

## 2023-12-04 DIAGNOSIS — L50.8 OTHER URTICARIA: Primary | ICD-10-CM

## 2023-12-04 PROCEDURE — 99211 OFF/OP EST MAY X REQ PHY/QHP: CPT

## 2023-12-04 PROCEDURE — 96372 THER/PROPH/DIAG INJ SC/IM: CPT

## 2023-12-04 ASSESSMENT — PAIN - FUNCTIONAL ASSESSMENT: PAIN_FUNCTIONAL_ASSESSMENT: 0-10

## 2023-12-26 ENCOUNTER — HOSPITAL ENCOUNTER (OUTPATIENT)
Dept: NURSING | Age: 63
Setting detail: INFUSION SERIES
Discharge: HOME | End: 2023-12-26
Payer: MEDICARE

## 2023-12-26 VITALS
BODY MASS INDEX: 22.33 KG/M2 | WEIGHT: 134 LBS | TEMPERATURE: 96.8 F | OXYGEN SATURATION: 99 % | HEART RATE: 83 BPM | DIASTOLIC BLOOD PRESSURE: 75 MMHG | HEIGHT: 65 IN | SYSTOLIC BLOOD PRESSURE: 139 MMHG | RESPIRATION RATE: 16 BRPM

## 2023-12-26 DIAGNOSIS — J45.902 SEVERE ASTHMA WITH STATUS ASTHMATICUS, UNSPECIFIED WHETHER PERSISTENT: Primary | ICD-10-CM

## 2023-12-26 PROCEDURE — 96372 THER/PROPH/DIAG INJ SC/IM: CPT

## 2023-12-26 PROCEDURE — 99211 OFF/OP EST MAY X REQ PHY/QHP: CPT

## 2024-01-15 ENCOUNTER — HOSPITAL ENCOUNTER (OUTPATIENT)
Dept: NURSING | Age: 64
Setting detail: INFUSION SERIES
Discharge: HOME | End: 2024-01-15
Payer: MEDICARE

## 2024-01-15 VITALS
SYSTOLIC BLOOD PRESSURE: 132 MMHG | RESPIRATION RATE: 16 BRPM | HEART RATE: 83 BPM | BODY MASS INDEX: 22.33 KG/M2 | DIASTOLIC BLOOD PRESSURE: 77 MMHG | OXYGEN SATURATION: 97 % | HEIGHT: 65 IN | TEMPERATURE: 97.1 F | WEIGHT: 134 LBS

## 2024-01-15 DIAGNOSIS — J45.902 SEVERE ASTHMA WITH STATUS ASTHMATICUS, UNSPECIFIED WHETHER PERSISTENT: Primary | ICD-10-CM

## 2024-01-15 PROCEDURE — 99211 OFF/OP EST MAY X REQ PHY/QHP: CPT

## 2024-01-15 PROCEDURE — 96372 THER/PROPH/DIAG INJ SC/IM: CPT

## 2024-01-15 PROCEDURE — 6360000002 HC RX W HCPCS: Performed by: ALLERGY & IMMUNOLOGY

## 2024-01-15 RX ADMIN — OMALIZUMAB 300 MG: 150 INJECTION, SOLUTION SUBCUTANEOUS at 10:15

## 2024-01-15 ASSESSMENT — PAIN - FUNCTIONAL ASSESSMENT: PAIN_FUNCTIONAL_ASSESSMENT: 0-10

## 2024-02-05 ENCOUNTER — HOSPITAL ENCOUNTER (OUTPATIENT)
Dept: NURSING | Age: 64
Setting detail: INFUSION SERIES
Discharge: HOME | End: 2024-02-05
Payer: MEDICARE

## 2024-02-05 VITALS
BODY MASS INDEX: 23.32 KG/M2 | RESPIRATION RATE: 16 BRPM | HEART RATE: 84 BPM | DIASTOLIC BLOOD PRESSURE: 81 MMHG | TEMPERATURE: 98.8 F | HEIGHT: 65 IN | OXYGEN SATURATION: 99 % | SYSTOLIC BLOOD PRESSURE: 124 MMHG | WEIGHT: 140 LBS

## 2024-02-05 DIAGNOSIS — J45.902 SEVERE ASTHMA WITH STATUS ASTHMATICUS, UNSPECIFIED WHETHER PERSISTENT: Primary | ICD-10-CM

## 2024-02-05 PROCEDURE — 96372 THER/PROPH/DIAG INJ SC/IM: CPT

## 2024-02-05 PROCEDURE — 99211 OFF/OP EST MAY X REQ PHY/QHP: CPT

## 2024-02-05 ASSESSMENT — PAIN - FUNCTIONAL ASSESSMENT: PAIN_FUNCTIONAL_ASSESSMENT: NONE - DENIES PAIN

## 2024-02-05 NOTE — PROGRESS NOTES
Pt tolerates injections well. Follow up appointment made for 3 weeks discharged to home in stable condition

## 2024-02-05 NOTE — PROGRESS NOTES
Called Dr tinsley's office and left message on nurse's line for prescription refill. Expires after todays dose of Xolair

## 2024-02-25 ENCOUNTER — HOSPITAL ENCOUNTER (EMERGENCY)
Age: 64
Discharge: HOME | End: 2024-02-25
Payer: MEDICARE

## 2024-02-25 ENCOUNTER — APPOINTMENT (OUTPATIENT)
Dept: GENERAL RADIOLOGY | Age: 64
End: 2024-02-25
Payer: MEDICARE

## 2024-02-25 VITALS
HEART RATE: 98 BPM | OXYGEN SATURATION: 98 % | BODY MASS INDEX: 23.32 KG/M2 | HEIGHT: 65 IN | DIASTOLIC BLOOD PRESSURE: 88 MMHG | WEIGHT: 140 LBS | RESPIRATION RATE: 18 BRPM | SYSTOLIC BLOOD PRESSURE: 162 MMHG | TEMPERATURE: 99 F

## 2024-02-25 DIAGNOSIS — M65.20 CALCIFIC TENDINITIS: ICD-10-CM

## 2024-02-25 DIAGNOSIS — M79.642 LEFT HAND PAIN: Primary | ICD-10-CM

## 2024-02-25 PROCEDURE — 99283 EMERGENCY DEPT VISIT LOW MDM: CPT

## 2024-02-25 PROCEDURE — 73130 X-RAY EXAM OF HAND: CPT

## 2024-02-25 ASSESSMENT — PAIN DESCRIPTION - LOCATION: LOCATION: HAND

## 2024-02-25 ASSESSMENT — PAIN SCALES - GENERAL: PAINLEVEL_OUTOF10: 8

## 2024-02-25 ASSESSMENT — PAIN DESCRIPTION - DESCRIPTORS: DESCRIPTORS: ACHING

## 2024-02-25 ASSESSMENT — PAIN - FUNCTIONAL ASSESSMENT: PAIN_FUNCTIONAL_ASSESSMENT: 0-10

## 2024-02-25 ASSESSMENT — PAIN DESCRIPTION - FREQUENCY: FREQUENCY: CONTINUOUS

## 2024-02-25 ASSESSMENT — PAIN DESCRIPTION - PAIN TYPE: TYPE: ACUTE PAIN

## 2024-02-25 ASSESSMENT — PAIN DESCRIPTION - ORIENTATION: ORIENTATION: LEFT

## 2024-02-25 NOTE — ED PROVIDER NOTES
Emphysema Neg Hx     Heart Failure Neg Hx     Hypertension Neg Hx      Social History     Socioeconomic History    Marital status:      Spouse name: Not on file    Number of children: Not on file    Years of education: Not on file    Highest education level: Not on file   Occupational History    Occupation: shipping     Comment: [employer]   Tobacco Use    Smoking status: Never    Smokeless tobacco: Never   Substance and Sexual Activity    Alcohol use: Yes     Alcohol/week: 6.0 standard drinks of alcohol     Types: 6 Cans of beer per week     Comment: weekly    Drug use: No     Comment: heroin.  Pt denies use on 9/25/12    Sexual activity: Yes     Partners: Male   Other Topics Concern    Not on file   Social History Narrative    Not on file     Social Determinants of Health     Financial Resource Strain: Not on file   Food Insecurity: Not on file   Transportation Needs: Not on file   Physical Activity: Not on file   Stress: Not on file   Social Connections: Not on file   Intimate Partner Violence: Not on file   Housing Stability: Not on file     No current facility-administered medications for this encounter.     Current Outpatient Medications   Medication Sig Dispense Refill    famotidine (PEPCID) 20 MG tablet Take 1 tablet by mouth daily      omalizumab (OMALIZUMAB) 150 MG injection Inject 300 mg into the skin once for 1 dose 1 vial 3    Cetirizine HCl (ZYRTEC ALLERGY) 10 MG CAPS Take by mouth      predniSONE (DELTASONE) 20 MG tablet Take 3 tablets by mouth daily 12 tablet 0    diphenhydrAMINE (BENYLIN) 12.5 MG/5ML liquid Take 25 mg by mouth 4 times daily as needed for Allergies      naproxen (NAPROSYN) 500 MG tablet Take 1 tablet by mouth 2 times daily 30 tablet 0    fluticasone-salmeterol (ADVAIR) 250-50 MCG/DOSE AEPB Inhale 1 puff into the lungs every 12 hours      levalbuterol (XOPENEX) 1.25 MG/3ML nebulizer solution Take 3 mLs by nebulization every 4 hours as needed for Wheezing. 120 mL 0

## 2024-02-26 ENCOUNTER — HOSPITAL ENCOUNTER (OUTPATIENT)
Dept: NURSING | Age: 64
Setting detail: INFUSION SERIES
Discharge: HOME | End: 2024-02-26
Payer: MEDICARE

## 2024-02-26 VITALS
DIASTOLIC BLOOD PRESSURE: 80 MMHG | WEIGHT: 140 LBS | HEIGHT: 65 IN | RESPIRATION RATE: 16 BRPM | HEART RATE: 96 BPM | BODY MASS INDEX: 23.32 KG/M2 | SYSTOLIC BLOOD PRESSURE: 129 MMHG | TEMPERATURE: 97.4 F | OXYGEN SATURATION: 94 %

## 2024-02-26 DIAGNOSIS — J45.902 SEVERE ASTHMA WITH STATUS ASTHMATICUS, UNSPECIFIED WHETHER PERSISTENT: Primary | ICD-10-CM

## 2024-02-26 PROCEDURE — 99211 OFF/OP EST MAY X REQ PHY/QHP: CPT

## 2024-02-26 PROCEDURE — 96372 THER/PROPH/DIAG INJ SC/IM: CPT

## 2024-02-26 ASSESSMENT — PAIN - FUNCTIONAL ASSESSMENT: PAIN_FUNCTIONAL_ASSESSMENT: 0-10

## 2024-02-26 NOTE — PROGRESS NOTES
Pt awake, alert, no complaints other than left hand - in ER yesterday due to \"bump\" on top of left hand that was painful, states bump is a little smaller today and a little less painful.

## 2024-03-18 ENCOUNTER — HOSPITAL ENCOUNTER (OUTPATIENT)
Dept: NURSING | Age: 64
Setting detail: INFUSION SERIES
Discharge: HOME | End: 2024-03-18
Payer: MEDICARE

## 2024-03-18 VITALS
WEIGHT: 140 LBS | TEMPERATURE: 96.6 F | SYSTOLIC BLOOD PRESSURE: 147 MMHG | BODY MASS INDEX: 23.32 KG/M2 | OXYGEN SATURATION: 100 % | HEIGHT: 65 IN | RESPIRATION RATE: 16 BRPM | DIASTOLIC BLOOD PRESSURE: 80 MMHG | HEART RATE: 90 BPM

## 2024-03-18 DIAGNOSIS — J45.902 SEVERE ASTHMA WITH STATUS ASTHMATICUS, UNSPECIFIED WHETHER PERSISTENT: Primary | ICD-10-CM

## 2024-03-18 PROCEDURE — 96372 THER/PROPH/DIAG INJ SC/IM: CPT

## 2024-03-18 PROCEDURE — 99211 OFF/OP EST MAY X REQ PHY/QHP: CPT

## 2024-03-18 ASSESSMENT — PAIN - FUNCTIONAL ASSESSMENT: PAIN_FUNCTIONAL_ASSESSMENT: NONE - DENIES PAIN

## 2024-03-18 NOTE — PROGRESS NOTES
Pt tolerates injections well. No s/s of reaction epi pen with patient. Follow up appointment made. Discharged to home in stable condition

## 2024-04-08 ENCOUNTER — HOSPITAL ENCOUNTER (OUTPATIENT)
Dept: NURSING | Age: 64
Setting detail: INFUSION SERIES
Discharge: HOME | End: 2024-04-08
Payer: MEDICARE

## 2024-04-08 VITALS
HEIGHT: 65 IN | OXYGEN SATURATION: 99 % | DIASTOLIC BLOOD PRESSURE: 91 MMHG | RESPIRATION RATE: 16 BRPM | SYSTOLIC BLOOD PRESSURE: 147 MMHG | BODY MASS INDEX: 23.32 KG/M2 | TEMPERATURE: 97.3 F | WEIGHT: 140 LBS | HEART RATE: 92 BPM

## 2024-04-08 DIAGNOSIS — J45.902 SEVERE ASTHMA WITH STATUS ASTHMATICUS, UNSPECIFIED WHETHER PERSISTENT: Primary | ICD-10-CM

## 2024-04-08 PROCEDURE — 99211 OFF/OP EST MAY X REQ PHY/QHP: CPT

## 2024-04-08 PROCEDURE — 96372 THER/PROPH/DIAG INJ SC/IM: CPT

## 2024-04-08 ASSESSMENT — PAIN - FUNCTIONAL ASSESSMENT: PAIN_FUNCTIONAL_ASSESSMENT: 0-10

## 2024-04-29 ENCOUNTER — HOSPITAL ENCOUNTER (OUTPATIENT)
Dept: NURSING | Age: 64
Setting detail: INFUSION SERIES
Discharge: HOME | End: 2024-04-29
Payer: MEDICARE

## 2024-04-29 VITALS
DIASTOLIC BLOOD PRESSURE: 79 MMHG | BODY MASS INDEX: 24.16 KG/M2 | HEART RATE: 85 BPM | TEMPERATURE: 97.2 F | RESPIRATION RATE: 16 BRPM | SYSTOLIC BLOOD PRESSURE: 136 MMHG | HEIGHT: 65 IN | WEIGHT: 145 LBS | OXYGEN SATURATION: 99 %

## 2024-04-29 DIAGNOSIS — J45.902 SEVERE ASTHMA WITH STATUS ASTHMATICUS, UNSPECIFIED WHETHER PERSISTENT: Primary | ICD-10-CM

## 2024-04-29 PROCEDURE — 99211 OFF/OP EST MAY X REQ PHY/QHP: CPT

## 2024-04-29 PROCEDURE — 96372 THER/PROPH/DIAG INJ SC/IM: CPT

## 2024-04-29 ASSESSMENT — PAIN - FUNCTIONAL ASSESSMENT: PAIN_FUNCTIONAL_ASSESSMENT: NONE - DENIES PAIN

## 2024-05-20 ENCOUNTER — HOSPITAL ENCOUNTER (OUTPATIENT)
Dept: NURSING | Age: 64
Setting detail: INFUSION SERIES
Discharge: HOME | End: 2024-05-20
Payer: MEDICARE

## 2024-05-20 VITALS
BODY MASS INDEX: 24.16 KG/M2 | HEIGHT: 65 IN | RESPIRATION RATE: 16 BRPM | WEIGHT: 145 LBS | SYSTOLIC BLOOD PRESSURE: 141 MMHG | OXYGEN SATURATION: 100 % | TEMPERATURE: 97.2 F | HEART RATE: 83 BPM | DIASTOLIC BLOOD PRESSURE: 79 MMHG

## 2024-05-20 DIAGNOSIS — J45.902 SEVERE ASTHMA WITH STATUS ASTHMATICUS, UNSPECIFIED WHETHER PERSISTENT: Primary | ICD-10-CM

## 2024-05-20 PROCEDURE — 99211 OFF/OP EST MAY X REQ PHY/QHP: CPT

## 2024-05-20 PROCEDURE — 96372 THER/PROPH/DIAG INJ SC/IM: CPT

## 2024-05-20 ASSESSMENT — PAIN SCALES - GENERAL: PAINLEVEL_OUTOF10: 0

## 2024-06-10 ENCOUNTER — HOSPITAL ENCOUNTER (OUTPATIENT)
Dept: NURSING | Age: 64
Setting detail: INFUSION SERIES
Discharge: HOME | End: 2024-06-10
Payer: MEDICARE

## 2024-06-10 VITALS
HEIGHT: 65 IN | SYSTOLIC BLOOD PRESSURE: 131 MMHG | WEIGHT: 145 LBS | HEART RATE: 111 BPM | DIASTOLIC BLOOD PRESSURE: 87 MMHG | OXYGEN SATURATION: 98 % | BODY MASS INDEX: 24.16 KG/M2 | TEMPERATURE: 97.8 F | RESPIRATION RATE: 16 BRPM

## 2024-06-10 DIAGNOSIS — J45.902 SEVERE ASTHMA WITH STATUS ASTHMATICUS, UNSPECIFIED WHETHER PERSISTENT: Primary | ICD-10-CM

## 2024-06-10 PROCEDURE — 99211 OFF/OP EST MAY X REQ PHY/QHP: CPT

## 2024-06-10 PROCEDURE — 96372 THER/PROPH/DIAG INJ SC/IM: CPT

## 2024-06-10 ASSESSMENT — PAIN - FUNCTIONAL ASSESSMENT: PAIN_FUNCTIONAL_ASSESSMENT: NONE - DENIES PAIN

## 2024-07-01 ENCOUNTER — HOSPITAL ENCOUNTER (OUTPATIENT)
Dept: NURSING | Age: 64
Setting detail: INFUSION SERIES
Discharge: HOME | End: 2024-07-01
Payer: MEDICARE

## 2024-07-01 VITALS
HEART RATE: 73 BPM | WEIGHT: 135 LBS | OXYGEN SATURATION: 100 % | RESPIRATION RATE: 18 BRPM | HEIGHT: 65 IN | DIASTOLIC BLOOD PRESSURE: 96 MMHG | SYSTOLIC BLOOD PRESSURE: 142 MMHG | BODY MASS INDEX: 22.49 KG/M2 | TEMPERATURE: 97.1 F

## 2024-07-01 DIAGNOSIS — J45.902 SEVERE ASTHMA WITH STATUS ASTHMATICUS, UNSPECIFIED WHETHER PERSISTENT: Primary | ICD-10-CM

## 2024-07-01 PROCEDURE — 99211 OFF/OP EST MAY X REQ PHY/QHP: CPT

## 2024-07-01 PROCEDURE — 96372 THER/PROPH/DIAG INJ SC/IM: CPT

## 2024-07-01 ASSESSMENT — PAIN - FUNCTIONAL ASSESSMENT: PAIN_FUNCTIONAL_ASSESSMENT: 0-10

## 2024-07-01 NOTE — PROGRESS NOTES
Injection given per order. Pt tolerated well. Declined education. Follow up appointments made. Discharged to home in stable condition

## 2024-07-22 ENCOUNTER — HOSPITAL ENCOUNTER (OUTPATIENT)
Dept: NURSING | Age: 64
Setting detail: INFUSION SERIES
Discharge: HOME | End: 2024-07-22
Payer: MEDICARE

## 2024-07-22 VITALS
HEART RATE: 85 BPM | BODY MASS INDEX: 22.49 KG/M2 | WEIGHT: 135 LBS | SYSTOLIC BLOOD PRESSURE: 144 MMHG | HEIGHT: 65 IN | DIASTOLIC BLOOD PRESSURE: 83 MMHG | TEMPERATURE: 97.8 F | RESPIRATION RATE: 18 BRPM | OXYGEN SATURATION: 99 %

## 2024-07-22 DIAGNOSIS — J45.902 SEVERE ASTHMA WITH STATUS ASTHMATICUS, UNSPECIFIED WHETHER PERSISTENT: Primary | ICD-10-CM

## 2024-07-22 PROCEDURE — 6360000002 HC RX W HCPCS

## 2024-07-22 PROCEDURE — 96372 THER/PROPH/DIAG INJ SC/IM: CPT

## 2024-07-22 ASSESSMENT — PAIN - FUNCTIONAL ASSESSMENT: PAIN_FUNCTIONAL_ASSESSMENT: NONE - DENIES PAIN

## 2024-07-22 NOTE — PROGRESS NOTES
Pt tolerates injections well in both arms. Follow up appointment made for 3 weeks. Discharged to home in stable condition

## 2024-07-24 ENCOUNTER — CLINICAL DOCUMENTATION (OUTPATIENT)
Facility: HOSPITAL | Age: 64
End: 2024-07-24

## 2024-07-24 NOTE — PROGRESS NOTES
Patient Assistance    Met with: Called patient and left v/m for return call     Navigator Type: Infusion  Documentation Type: Assistance Review  Contact Type: Telephone (Left V/M for patient)  Status of Patient Insurance Coverage: Patient has active coverage  Form of PAP Assistance: [employer] Assistance  Patient Assistance Sponsor Name: [employer] (PAF)          Additional notes: [employer] Approval     Drug Name: Xolair  Form of PAP Assistance: Copay / [employer] (Approved)

## 2024-08-12 ENCOUNTER — HOSPITAL ENCOUNTER (OUTPATIENT)
Dept: NURSING | Age: 64
Setting detail: INFUSION SERIES
Discharge: HOME | End: 2024-08-12
Payer: MEDICARE

## 2024-08-12 VITALS
HEIGHT: 65 IN | OXYGEN SATURATION: 99 % | WEIGHT: 135 LBS | HEART RATE: 72 BPM | TEMPERATURE: 96.8 F | SYSTOLIC BLOOD PRESSURE: 130 MMHG | BODY MASS INDEX: 22.49 KG/M2 | RESPIRATION RATE: 18 BRPM | DIASTOLIC BLOOD PRESSURE: 77 MMHG

## 2024-08-12 DIAGNOSIS — J45.902 SEVERE ASTHMA WITH STATUS ASTHMATICUS, UNSPECIFIED WHETHER PERSISTENT: Primary | ICD-10-CM

## 2024-08-12 PROCEDURE — 6360000002 HC RX W HCPCS: Performed by: ALLERGY & IMMUNOLOGY

## 2024-08-12 PROCEDURE — 96372 THER/PROPH/DIAG INJ SC/IM: CPT

## 2024-08-12 RX ADMIN — OMALIZUMAB 300 MG: 150 INJECTION, SOLUTION SUBCUTANEOUS at 10:14

## 2024-09-03 ENCOUNTER — HOSPITAL ENCOUNTER (OUTPATIENT)
Dept: NURSING | Age: 64
Setting detail: INFUSION SERIES
Discharge: HOME | End: 2024-09-03
Payer: MEDICARE

## 2024-09-03 VITALS
WEIGHT: 135 LBS | RESPIRATION RATE: 18 BRPM | OXYGEN SATURATION: 98 % | SYSTOLIC BLOOD PRESSURE: 126 MMHG | DIASTOLIC BLOOD PRESSURE: 98 MMHG | HEIGHT: 65 IN | HEART RATE: 97 BPM | TEMPERATURE: 97.3 F | BODY MASS INDEX: 22.49 KG/M2

## 2024-09-03 DIAGNOSIS — J45.902 SEVERE ASTHMA WITH STATUS ASTHMATICUS, UNSPECIFIED WHETHER PERSISTENT: Primary | ICD-10-CM

## 2024-09-03 PROCEDURE — 96372 THER/PROPH/DIAG INJ SC/IM: CPT

## 2024-09-03 ASSESSMENT — PAIN - FUNCTIONAL ASSESSMENT: PAIN_FUNCTIONAL_ASSESSMENT: NONE - DENIES PAIN

## 2024-09-03 NOTE — PROGRESS NOTES
Pt tolerates injections in right and left arm well. No s/s of reaction. Patient has epi pen in possession. Follow up appointment made. Discharged to home in stable condition

## 2024-09-23 ENCOUNTER — HOSPITAL ENCOUNTER (OUTPATIENT)
Dept: NURSING | Age: 64
Setting detail: INFUSION SERIES
Discharge: HOME | End: 2024-09-23
Payer: MEDICARE

## 2024-09-23 VITALS
BODY MASS INDEX: 22.49 KG/M2 | DIASTOLIC BLOOD PRESSURE: 83 MMHG | RESPIRATION RATE: 18 BRPM | HEIGHT: 65 IN | HEART RATE: 92 BPM | WEIGHT: 135 LBS | TEMPERATURE: 97.2 F | SYSTOLIC BLOOD PRESSURE: 158 MMHG | OXYGEN SATURATION: 99 %

## 2024-09-23 DIAGNOSIS — J45.902 SEVERE ASTHMA WITH STATUS ASTHMATICUS, UNSPECIFIED WHETHER PERSISTENT: Primary | ICD-10-CM

## 2024-09-23 PROCEDURE — 6360000002 HC RX W HCPCS: Performed by: ALLERGY & IMMUNOLOGY

## 2024-09-23 PROCEDURE — 96372 THER/PROPH/DIAG INJ SC/IM: CPT

## 2024-09-23 RX ADMIN — OMALIZUMAB 300 MG: 150 INJECTION, SOLUTION SUBCUTANEOUS at 10:04

## 2024-10-14 ENCOUNTER — HOSPITAL ENCOUNTER (OUTPATIENT)
Dept: NURSING | Age: 64
Setting detail: INFUSION SERIES
Discharge: HOME | End: 2024-10-14

## 2024-10-17 ENCOUNTER — HOSPITAL ENCOUNTER (OUTPATIENT)
Dept: NURSING | Age: 64
Setting detail: INFUSION SERIES
Discharge: HOME | End: 2024-10-17
Payer: MEDICARE

## 2024-10-17 VITALS
BODY MASS INDEX: 22.49 KG/M2 | SYSTOLIC BLOOD PRESSURE: 141 MMHG | WEIGHT: 135 LBS | OXYGEN SATURATION: 99 % | TEMPERATURE: 96.5 F | RESPIRATION RATE: 16 BRPM | HEIGHT: 65 IN | HEART RATE: 77 BPM | DIASTOLIC BLOOD PRESSURE: 80 MMHG

## 2024-10-17 DIAGNOSIS — J45.902 SEVERE ASTHMA WITH STATUS ASTHMATICUS, UNSPECIFIED WHETHER PERSISTENT: Primary | ICD-10-CM

## 2024-10-17 PROCEDURE — 96372 THER/PROPH/DIAG INJ SC/IM: CPT

## 2024-10-17 PROCEDURE — 6360000002 HC RX W HCPCS: Performed by: ALLERGY & IMMUNOLOGY

## 2024-10-17 RX ADMIN — OMALIZUMAB 300 MG: 150 INJECTION, SOLUTION SUBCUTANEOUS at 09:37

## 2024-11-06 ENCOUNTER — HOSPITAL ENCOUNTER (OUTPATIENT)
Dept: NURSING | Age: 64
Setting detail: INFUSION SERIES
Discharge: HOME | End: 2024-11-06
Payer: MEDICARE

## 2024-11-06 VITALS
BODY MASS INDEX: 22.49 KG/M2 | WEIGHT: 135 LBS | DIASTOLIC BLOOD PRESSURE: 87 MMHG | SYSTOLIC BLOOD PRESSURE: 147 MMHG | OXYGEN SATURATION: 98 % | RESPIRATION RATE: 16 BRPM | TEMPERATURE: 96.9 F | HEIGHT: 65 IN | HEART RATE: 83 BPM

## 2024-11-06 DIAGNOSIS — J45.902 SEVERE ASTHMA WITH STATUS ASTHMATICUS, UNSPECIFIED WHETHER PERSISTENT: Primary | ICD-10-CM

## 2024-11-06 PROCEDURE — 6360000002 HC RX W HCPCS: Performed by: ALLERGY & IMMUNOLOGY

## 2024-11-06 PROCEDURE — 96372 THER/PROPH/DIAG INJ SC/IM: CPT

## 2024-11-06 RX ADMIN — OMALIZUMAB 300 MG: 150 INJECTION, SOLUTION SUBCUTANEOUS at 10:08

## 2024-11-06 ASSESSMENT — PAIN - FUNCTIONAL ASSESSMENT: PAIN_FUNCTIONAL_ASSESSMENT: NONE - DENIES PAIN

## 2024-11-06 NOTE — PROGRESS NOTES
Pt tolerates injection well in right and left arms.follow up appointment made for 3 weeks. Discharged to home in stable condition

## 2024-11-26 ENCOUNTER — HOSPITAL ENCOUNTER (OUTPATIENT)
Dept: NURSING | Age: 64
Setting detail: INFUSION SERIES
Discharge: HOME | End: 2024-11-26
Payer: MEDICARE

## 2024-11-26 VITALS
HEART RATE: 100 BPM | OXYGEN SATURATION: 96 % | RESPIRATION RATE: 16 BRPM | WEIGHT: 135 LBS | SYSTOLIC BLOOD PRESSURE: 137 MMHG | TEMPERATURE: 96.9 F | HEIGHT: 65 IN | BODY MASS INDEX: 22.49 KG/M2 | DIASTOLIC BLOOD PRESSURE: 80 MMHG

## 2024-11-26 DIAGNOSIS — J45.902 SEVERE ASTHMA WITH STATUS ASTHMATICUS, UNSPECIFIED WHETHER PERSISTENT: Primary | ICD-10-CM

## 2024-11-26 PROCEDURE — 96372 THER/PROPH/DIAG INJ SC/IM: CPT

## 2024-11-26 PROCEDURE — 6360000002 HC RX W HCPCS: Performed by: ALLERGY & IMMUNOLOGY

## 2024-11-26 RX ADMIN — OMALIZUMAB 300 MG: 150 INJECTION, SOLUTION SUBCUTANEOUS at 10:03

## 2024-11-26 ASSESSMENT — PAIN - FUNCTIONAL ASSESSMENT: PAIN_FUNCTIONAL_ASSESSMENT: NONE - DENIES PAIN

## 2024-11-26 NOTE — PROGRESS NOTES
Pt tolerates injections well in right and left arms. Follow up appointment made for 3 weeks. Discharged to home in stable condition

## 2024-12-16 ENCOUNTER — HOSPITAL ENCOUNTER (OUTPATIENT)
Dept: NURSING | Age: 64
Setting detail: INFUSION SERIES
Discharge: HOME | End: 2024-12-16
Payer: MEDICARE

## 2024-12-16 VITALS
TEMPERATURE: 97 F | SYSTOLIC BLOOD PRESSURE: 122 MMHG | OXYGEN SATURATION: 99 % | HEIGHT: 65 IN | DIASTOLIC BLOOD PRESSURE: 81 MMHG | RESPIRATION RATE: 16 BRPM | BODY MASS INDEX: 22.49 KG/M2 | HEART RATE: 99 BPM | WEIGHT: 135 LBS

## 2024-12-16 DIAGNOSIS — J45.902 SEVERE ASTHMA WITH STATUS ASTHMATICUS, UNSPECIFIED WHETHER PERSISTENT: Primary | ICD-10-CM

## 2024-12-16 PROCEDURE — 6360000002 HC RX W HCPCS: Performed by: ALLERGY & IMMUNOLOGY

## 2024-12-16 PROCEDURE — 96372 THER/PROPH/DIAG INJ SC/IM: CPT

## 2024-12-16 RX ADMIN — OMALIZUMAB 300 MG: 150 INJECTION, SOLUTION SUBCUTANEOUS at 10:26

## 2024-12-16 NOTE — PROGRESS NOTES
Pt given xolair per orders, pt tolerated well, next appt made. Pt discharged to home. No complaints.

## 2025-01-08 ENCOUNTER — HOSPITAL ENCOUNTER (OUTPATIENT)
Dept: NURSING | Age: 65
Setting detail: INFUSION SERIES
Discharge: HOME | End: 2025-01-08
Payer: MEDICARE

## 2025-01-08 VITALS
HEIGHT: 65 IN | RESPIRATION RATE: 16 BRPM | SYSTOLIC BLOOD PRESSURE: 130 MMHG | BODY MASS INDEX: 22.49 KG/M2 | DIASTOLIC BLOOD PRESSURE: 71 MMHG | OXYGEN SATURATION: 99 % | TEMPERATURE: 97.3 F | HEART RATE: 80 BPM | WEIGHT: 135 LBS

## 2025-01-08 DIAGNOSIS — J45.902 SEVERE ASTHMA WITH STATUS ASTHMATICUS, UNSPECIFIED WHETHER PERSISTENT: Primary | ICD-10-CM

## 2025-01-08 PROCEDURE — 6360000002 HC RX W HCPCS: Performed by: ALLERGY & IMMUNOLOGY

## 2025-01-08 PROCEDURE — 96372 THER/PROPH/DIAG INJ SC/IM: CPT

## 2025-01-08 RX ADMIN — OMALIZUMAB 300 MG: 150 INJECTION, SOLUTION SUBCUTANEOUS at 07:59

## 2025-01-08 ASSESSMENT — PAIN - FUNCTIONAL ASSESSMENT: PAIN_FUNCTIONAL_ASSESSMENT: NONE - DENIES PAIN

## 2025-01-08 NOTE — PROGRESS NOTES
Patient tolerates injections well no s/s of reaction. Follow up appointment made. Discharged to home in stable condition

## 2025-01-27 ENCOUNTER — APPOINTMENT (OUTPATIENT)
Dept: NURSING | Age: 65
End: 2025-01-27
Payer: MEDICARE

## 2025-01-28 ENCOUNTER — HOSPITAL ENCOUNTER (OUTPATIENT)
Dept: NURSING | Age: 65
Setting detail: INFUSION SERIES
Discharge: HOME | End: 2025-01-28
Payer: MEDICARE

## 2025-01-28 VITALS
DIASTOLIC BLOOD PRESSURE: 85 MMHG | BODY MASS INDEX: 22.49 KG/M2 | WEIGHT: 135 LBS | RESPIRATION RATE: 16 BRPM | HEIGHT: 65 IN | SYSTOLIC BLOOD PRESSURE: 132 MMHG | OXYGEN SATURATION: 97 % | TEMPERATURE: 96.8 F

## 2025-01-28 DIAGNOSIS — J45.902 SEVERE ASTHMA WITH STATUS ASTHMATICUS, UNSPECIFIED WHETHER PERSISTENT: Primary | ICD-10-CM

## 2025-01-28 PROCEDURE — 6360000002 HC RX W HCPCS: Performed by: ALLERGY & IMMUNOLOGY

## 2025-01-28 PROCEDURE — 96372 THER/PROPH/DIAG INJ SC/IM: CPT

## 2025-01-28 RX ADMIN — OMALIZUMAB 300 MG: 150 INJECTION, SOLUTION SUBCUTANEOUS at 09:20

## 2025-01-28 ASSESSMENT — PAIN - FUNCTIONAL ASSESSMENT: PAIN_FUNCTIONAL_ASSESSMENT: NONE - DENIES PAIN

## 2025-01-28 NOTE — PROGRESS NOTES
Pt tolerates injections in both arms well with no s/s of reaction. Follow up appointment made for February. Discharged to home in stable condition

## 2025-02-17 ENCOUNTER — HOSPITAL ENCOUNTER (OUTPATIENT)
Dept: NURSING | Age: 65
Setting detail: INFUSION SERIES
Discharge: HOME | End: 2025-02-17
Payer: MEDICARE

## 2025-02-17 VITALS
BODY MASS INDEX: 22.49 KG/M2 | HEIGHT: 65 IN | HEART RATE: 90 BPM | SYSTOLIC BLOOD PRESSURE: 140 MMHG | OXYGEN SATURATION: 99 % | DIASTOLIC BLOOD PRESSURE: 88 MMHG | WEIGHT: 135 LBS | TEMPERATURE: 97 F | RESPIRATION RATE: 16 BRPM

## 2025-02-17 DIAGNOSIS — J45.902 SEVERE ASTHMA WITH STATUS ASTHMATICUS, UNSPECIFIED WHETHER PERSISTENT: Primary | ICD-10-CM

## 2025-02-17 PROCEDURE — 6360000002 HC RX W HCPCS: Performed by: ALLERGY & IMMUNOLOGY

## 2025-02-17 PROCEDURE — 96372 THER/PROPH/DIAG INJ SC/IM: CPT

## 2025-02-17 RX ADMIN — OMALIZUMAB 300 MG: 150 INJECTION, SOLUTION SUBCUTANEOUS at 10:45

## 2025-02-17 ASSESSMENT — PAIN - FUNCTIONAL ASSESSMENT: PAIN_FUNCTIONAL_ASSESSMENT: NONE - DENIES PAIN

## 2025-02-17 NOTE — PROGRESS NOTES
Patient tolerates injections in both arms well. Follow up appointment made. Discharged to home in stable condition

## 2025-03-10 ENCOUNTER — HOSPITAL ENCOUNTER (OUTPATIENT)
Dept: NURSING | Age: 65
Setting detail: INFUSION SERIES
Discharge: HOME | End: 2025-03-10
Payer: MEDICARE

## 2025-03-10 VITALS
RESPIRATION RATE: 18 BRPM | WEIGHT: 135 LBS | HEART RATE: 81 BPM | TEMPERATURE: 98.2 F | HEIGHT: 65 IN | BODY MASS INDEX: 22.49 KG/M2 | OXYGEN SATURATION: 100 % | DIASTOLIC BLOOD PRESSURE: 77 MMHG | SYSTOLIC BLOOD PRESSURE: 151 MMHG

## 2025-03-10 DIAGNOSIS — J45.902 SEVERE ASTHMA WITH STATUS ASTHMATICUS, UNSPECIFIED WHETHER PERSISTENT: Primary | ICD-10-CM

## 2025-03-10 PROCEDURE — 6360000002 HC RX W HCPCS: Performed by: ALLERGY & IMMUNOLOGY

## 2025-03-10 PROCEDURE — 96372 THER/PROPH/DIAG INJ SC/IM: CPT

## 2025-03-10 RX ADMIN — OMALIZUMAB 300 MG: 150 INJECTION, SOLUTION SUBCUTANEOUS at 10:08

## 2025-03-10 ASSESSMENT — PAIN - FUNCTIONAL ASSESSMENT: PAIN_FUNCTIONAL_ASSESSMENT: 0-10

## 2025-03-10 NOTE — PROGRESS NOTES
Patient tolerates bilateral Injections. Follow up appointments made. Declined education and discharged to home instable condition

## 2025-03-31 ENCOUNTER — HOSPITAL ENCOUNTER (OUTPATIENT)
Dept: NURSING | Age: 65
Setting detail: INFUSION SERIES
Discharge: HOME | End: 2025-03-31

## 2025-04-03 ENCOUNTER — HOSPITAL ENCOUNTER (OUTPATIENT)
Dept: NURSING | Age: 65
Setting detail: INFUSION SERIES
Discharge: HOME | End: 2025-04-03

## 2025-04-07 ENCOUNTER — HOSPITAL ENCOUNTER (OUTPATIENT)
Dept: NURSING | Age: 65
Setting detail: INFUSION SERIES
Discharge: HOME | End: 2025-04-07
Payer: MEDICARE

## 2025-04-07 VITALS
TEMPERATURE: 97.4 F | SYSTOLIC BLOOD PRESSURE: 156 MMHG | RESPIRATION RATE: 18 BRPM | OXYGEN SATURATION: 97 % | WEIGHT: 135 LBS | BODY MASS INDEX: 22.49 KG/M2 | DIASTOLIC BLOOD PRESSURE: 87 MMHG | HEART RATE: 99 BPM | HEIGHT: 65 IN

## 2025-04-07 DIAGNOSIS — J45.902 SEVERE ASTHMA WITH STATUS ASTHMATICUS, UNSPECIFIED WHETHER PERSISTENT: Primary | ICD-10-CM

## 2025-04-07 PROCEDURE — 96372 THER/PROPH/DIAG INJ SC/IM: CPT

## 2025-04-07 PROCEDURE — 6360000002 HC RX W HCPCS: Performed by: ALLERGY & IMMUNOLOGY

## 2025-04-07 RX ADMIN — OMALIZUMAB 300 MG: 150 INJECTION, SOLUTION SUBCUTANEOUS at 12:15

## 2025-04-07 NOTE — PROGRESS NOTES
Pt awake, alert, no complaints, VSS. Xolair given per orders, pt tolerated well, next appts made. Pt discharged to home, gait steady.

## 2025-04-21 ENCOUNTER — APPOINTMENT (OUTPATIENT)
Dept: NURSING | Age: 65
End: 2025-04-21
Payer: MEDICARE

## 2025-04-28 ENCOUNTER — HOSPITAL ENCOUNTER (OUTPATIENT)
Dept: NURSING | Age: 65
Setting detail: INFUSION SERIES
Discharge: HOME | End: 2025-04-28
Payer: MEDICARE

## 2025-04-28 VITALS
HEART RATE: 95 BPM | HEIGHT: 65 IN | DIASTOLIC BLOOD PRESSURE: 87 MMHG | RESPIRATION RATE: 16 BRPM | SYSTOLIC BLOOD PRESSURE: 151 MMHG | WEIGHT: 135 LBS | OXYGEN SATURATION: 100 % | BODY MASS INDEX: 22.49 KG/M2 | TEMPERATURE: 98.1 F

## 2025-04-28 DIAGNOSIS — J45.902 SEVERE ASTHMA WITH STATUS ASTHMATICUS, UNSPECIFIED WHETHER PERSISTENT: Primary | ICD-10-CM

## 2025-04-28 PROCEDURE — 96372 THER/PROPH/DIAG INJ SC/IM: CPT

## 2025-04-28 PROCEDURE — 6360000002 HC RX W HCPCS: Performed by: ALLERGY & IMMUNOLOGY

## 2025-04-28 RX ADMIN — OMALIZUMAB 300 MG: 150 INJECTION, SOLUTION SUBCUTANEOUS at 09:38

## 2025-04-28 ASSESSMENT — PAIN - FUNCTIONAL ASSESSMENT: PAIN_FUNCTIONAL_ASSESSMENT: NONE - DENIES PAIN

## 2025-04-28 NOTE — PROGRESS NOTES
Patient tolerates injections in right and left arms well.no s/s of reaction. Follow up appointment made. Discharged to home in stable condition

## 2025-05-19 ENCOUNTER — HOSPITAL ENCOUNTER (OUTPATIENT)
Dept: NURSING | Age: 65
Setting detail: INFUSION SERIES
Discharge: HOME | End: 2025-05-19
Payer: MEDICARE

## 2025-05-19 VITALS
DIASTOLIC BLOOD PRESSURE: 88 MMHG | HEART RATE: 93 BPM | HEIGHT: 65 IN | SYSTOLIC BLOOD PRESSURE: 147 MMHG | WEIGHT: 135 LBS | OXYGEN SATURATION: 99 % | RESPIRATION RATE: 16 BRPM | TEMPERATURE: 98.6 F | BODY MASS INDEX: 22.49 KG/M2

## 2025-05-19 DIAGNOSIS — J45.902 SEVERE ASTHMA WITH STATUS ASTHMATICUS, UNSPECIFIED WHETHER PERSISTENT: Primary | ICD-10-CM

## 2025-05-19 PROCEDURE — 96372 THER/PROPH/DIAG INJ SC/IM: CPT

## 2025-05-19 PROCEDURE — 6360000002 HC RX W HCPCS: Performed by: ALLERGY & IMMUNOLOGY

## 2025-05-19 RX ADMIN — OMALIZUMAB 300 MG: 150 INJECTION, SOLUTION SUBCUTANEOUS at 10:13

## 2025-05-19 ASSESSMENT — PAIN - FUNCTIONAL ASSESSMENT: PAIN_FUNCTIONAL_ASSESSMENT: NONE - DENIES PAIN

## 2025-05-19 NOTE — PROGRESS NOTES
Pt stable upon admission, VSS, tolerated injection well, follow up reviewed, pt stable and discharged home

## 2025-06-09 ENCOUNTER — HOSPITAL ENCOUNTER (OUTPATIENT)
Dept: NURSING | Age: 65
Setting detail: INFUSION SERIES
Discharge: HOME | End: 2025-06-09
Payer: MEDICARE

## 2025-06-09 VITALS
HEIGHT: 65 IN | TEMPERATURE: 98.4 F | HEART RATE: 102 BPM | OXYGEN SATURATION: 99 % | RESPIRATION RATE: 16 BRPM | WEIGHT: 135 LBS | BODY MASS INDEX: 22.49 KG/M2 | SYSTOLIC BLOOD PRESSURE: 138 MMHG | DIASTOLIC BLOOD PRESSURE: 73 MMHG

## 2025-06-09 DIAGNOSIS — J45.902 SEVERE ASTHMA WITH STATUS ASTHMATICUS, UNSPECIFIED WHETHER PERSISTENT: Primary | ICD-10-CM

## 2025-06-09 PROCEDURE — 96372 THER/PROPH/DIAG INJ SC/IM: CPT

## 2025-06-09 PROCEDURE — 6360000002 HC RX W HCPCS: Performed by: ALLERGY & IMMUNOLOGY

## 2025-06-09 RX ADMIN — OMALIZUMAB 300 MG: 150 INJECTION, SOLUTION SUBCUTANEOUS at 10:12

## 2025-06-09 ASSESSMENT — PAIN - FUNCTIONAL ASSESSMENT: PAIN_FUNCTIONAL_ASSESSMENT: NONE - DENIES PAIN

## 2025-06-09 NOTE — PROGRESS NOTES
Patient tolerates injections well in both arms subcutaneous. Follow up appointment made for 3 weeks. Discharged to home in stable condition

## 2025-06-23 PROBLEM — Z76.89 ENCOUNTER TO ESTABLISH CARE: Status: ACTIVE | Noted: 2025-06-23

## 2025-06-24 ENCOUNTER — OFFICE VISIT (OUTPATIENT)
Dept: FAMILY MEDICINE CLINIC | Age: 65
End: 2025-06-24
Payer: MEDICARE

## 2025-06-24 VITALS
HEART RATE: 100 BPM | BODY MASS INDEX: 22.99 KG/M2 | WEIGHT: 138 LBS | HEIGHT: 65 IN | OXYGEN SATURATION: 97 % | RESPIRATION RATE: 16 BRPM | SYSTOLIC BLOOD PRESSURE: 124 MMHG | DIASTOLIC BLOOD PRESSURE: 80 MMHG

## 2025-06-24 DIAGNOSIS — R20.2 PARESTHESIAS: ICD-10-CM

## 2025-06-24 DIAGNOSIS — Z76.89 ENCOUNTER TO ESTABLISH CARE: Primary | ICD-10-CM

## 2025-06-24 LAB
ALBUMIN SERPL-MCNC: 4.6 G/DL (ref 3.4–5)
ALBUMIN/GLOB SERPL: 1.9 {RATIO} (ref 1.1–2.2)
ALP SERPL-CCNC: 53 U/L (ref 40–129)
ALT SERPL-CCNC: 37 U/L (ref 10–40)
ANION GAP SERPL CALCULATED.3IONS-SCNC: 14 MMOL/L (ref 3–16)
AST SERPL-CCNC: 30 U/L (ref 15–37)
BASOPHILS # BLD: 0.1 K/UL (ref 0–0.2)
BASOPHILS NFR BLD: 1.2 %
BILIRUB SERPL-MCNC: 0.4 MG/DL (ref 0–1)
BUN SERPL-MCNC: 25 MG/DL (ref 7–20)
CALCIUM SERPL-MCNC: 10 MG/DL (ref 8.3–10.6)
CHLORIDE SERPL-SCNC: 102 MMOL/L (ref 99–110)
CHOLEST SERPL-MCNC: 299 MG/DL (ref 0–199)
CO2 SERPL-SCNC: 25 MMOL/L (ref 21–32)
CREAT SERPL-MCNC: 1.1 MG/DL (ref 0.6–1.2)
DEPRECATED RDW RBC AUTO: 12.8 % (ref 12.4–15.4)
EOSINOPHIL # BLD: 0 K/UL (ref 0–0.6)
EOSINOPHIL NFR BLD: 1 %
EST. AVERAGE GLUCOSE BLD GHB EST-MCNC: 111.2 MG/DL
GFR SERPLBLD CREATININE-BSD FMLA CKD-EPI: 56 ML/MIN/{1.73_M2}
GLUCOSE SERPL-MCNC: 129 MG/DL (ref 70–99)
HBA1C MFR BLD: 5.5 %
HCT VFR BLD AUTO: 45.3 % (ref 36–48)
HDLC SERPL-MCNC: 138 MG/DL (ref 40–60)
HGB BLD-MCNC: 15.7 G/DL (ref 12–16)
LDLC SERPL CALC-MCNC: 145 MG/DL
LYMPHOCYTES # BLD: 1.3 K/UL (ref 1–5.1)
LYMPHOCYTES NFR BLD: 26.2 %
MCH RBC QN AUTO: 32.3 PG (ref 26–34)
MCHC RBC AUTO-ENTMCNC: 34.7 G/DL (ref 31–36)
MCV RBC AUTO: 93.2 FL (ref 80–100)
MONOCYTES # BLD: 0.4 K/UL (ref 0–1.3)
MONOCYTES NFR BLD: 8.7 %
NEUTROPHILS # BLD: 3.1 K/UL (ref 1.7–7.7)
NEUTROPHILS NFR BLD: 62.9 %
PLATELET # BLD AUTO: 248 K/UL (ref 135–450)
PMV BLD AUTO: 7.9 FL (ref 5–10.5)
POTASSIUM SERPL-SCNC: 5.2 MMOL/L (ref 3.5–5.1)
PROT SERPL-MCNC: 7 G/DL (ref 6.4–8.2)
RBC # BLD AUTO: 4.86 M/UL (ref 4–5.2)
SODIUM SERPL-SCNC: 141 MMOL/L (ref 136–145)
TRIGL SERPL-MCNC: 82 MG/DL (ref 0–150)
VLDLC SERPL CALC-MCNC: 16 MG/DL
WBC # BLD AUTO: 5 K/UL (ref 4–11)

## 2025-06-24 PROCEDURE — 36415 COLL VENOUS BLD VENIPUNCTURE: CPT | Performed by: NURSE PRACTITIONER

## 2025-06-24 PROCEDURE — 1036F TOBACCO NON-USER: CPT | Performed by: NURSE PRACTITIONER

## 2025-06-24 PROCEDURE — G8427 DOCREV CUR MEDS BY ELIG CLIN: HCPCS | Performed by: NURSE PRACTITIONER

## 2025-06-24 PROCEDURE — 3017F COLORECTAL CA SCREEN DOC REV: CPT | Performed by: NURSE PRACTITIONER

## 2025-06-24 PROCEDURE — 99214 OFFICE O/P EST MOD 30 MIN: CPT | Performed by: NURSE PRACTITIONER

## 2025-06-24 PROCEDURE — G8420 CALC BMI NORM PARAMETERS: HCPCS | Performed by: NURSE PRACTITIONER

## 2025-06-24 RX ORDER — PREDNISONE 5 MG/ML
20 SOLUTION ORAL AS NEEDED
COMMUNITY

## 2025-06-24 SDOH — ECONOMIC STABILITY: FOOD INSECURITY: WITHIN THE PAST 12 MONTHS, THE FOOD YOU BOUGHT JUST DIDN'T LAST AND YOU DIDN'T HAVE MONEY TO GET MORE.: NEVER TRUE

## 2025-06-24 SDOH — ECONOMIC STABILITY: FOOD INSECURITY: WITHIN THE PAST 12 MONTHS, YOU WORRIED THAT YOUR FOOD WOULD RUN OUT BEFORE YOU GOT MONEY TO BUY MORE.: NEVER TRUE

## 2025-06-24 ASSESSMENT — PATIENT HEALTH QUESTIONNAIRE - PHQ9
SUM OF ALL RESPONSES TO PHQ QUESTIONS 1-9: 0
1. LITTLE INTEREST OR PLEASURE IN DOING THINGS: NOT AT ALL
2. FEELING DOWN, DEPRESSED OR HOPELESS: NOT AT ALL

## 2025-06-24 NOTE — PROGRESS NOTES
PROGRESS NOTE  Date of Service:  6/24/2025    SUBJECTIVE:  Patient ID: Yamel Dhaliwal is a 65 y.o. female    HPI: new patient exam  Former pt of [facility]   Chart reviewed  injection noted for severe hives  [place] palsy few months ago    History of Present Illness  The patient is a 65-year-old female who presents for a new patient exam.    The primary reason for this visit is to establish care and address her health maintenance needs. She has been receiving injections every 3 weeks for the past decade, administered by Dr. Kimbrough at [facility]. These injections are part of her treatment plan for chronic idiopathic urticaria, which manifests as hives in her airway, causing it to swell shut. She carries an EpiPen and steroids as a precautionary measure. Her daily medication regimen includes Zyrtec and Pepcid. She also has a regular inhaler, which she rarely uses due to her lungs being in good condition. She does not use Xopenex or inhaled corticosteroids. She has had 4 different tracheostomies in the past.    She has not had any blood work done since 2019 and is here to start getting regular check-ups, including blood work. She reports no shortness of breath, chest pain, or difficulty breathing on a daily basis. She also reports no nausea, vomiting, or diarrhea. She has never had a colonoscopy or Cologuard test. She has never had a mammogram and is not interested in having one. She has not had a Pap smear in approximately 30 years and still has a cervix.    She walks 4 miles a day and is building a 38-foot wooden boat, which requires a lot of physical effort. She does not wear sunscreen. She has never had an eye exam and is not up to date on her dental check-ups. She is willing to have blood work done today and is open to starting medication if necessary. Her diet includes a lot of cheese, chicken, and salad. She takes a caffeine pill occasionally to wake up but does not drink coffee. She

## 2025-06-25 ENCOUNTER — RESULTS FOLLOW-UP (OUTPATIENT)
Dept: FAMILY MEDICINE CLINIC | Age: 65
End: 2025-06-25

## 2025-06-26 NOTE — ASSESSMENT & PLAN NOTE
Elevate feet when ever possible  We will talk when labs are back possible referral to ortho neuro podiatry cardiology

## 2025-06-30 ENCOUNTER — HOSPITAL ENCOUNTER (OUTPATIENT)
Dept: NURSING | Age: 65
Setting detail: INFUSION SERIES
Discharge: HOME | End: 2025-06-30
Payer: MEDICARE

## 2025-06-30 VITALS
RESPIRATION RATE: 16 BRPM | TEMPERATURE: 98.1 F | OXYGEN SATURATION: 97 % | SYSTOLIC BLOOD PRESSURE: 162 MMHG | HEIGHT: 65 IN | DIASTOLIC BLOOD PRESSURE: 94 MMHG | WEIGHT: 138 LBS | BODY MASS INDEX: 22.99 KG/M2 | HEART RATE: 87 BPM

## 2025-06-30 DIAGNOSIS — J45.902 SEVERE ASTHMA WITH STATUS ASTHMATICUS, UNSPECIFIED WHETHER PERSISTENT: Primary | ICD-10-CM

## 2025-06-30 PROCEDURE — 96372 THER/PROPH/DIAG INJ SC/IM: CPT

## 2025-06-30 PROCEDURE — 6360000002 HC RX W HCPCS: Performed by: ALLERGY & IMMUNOLOGY

## 2025-06-30 RX ADMIN — OMALIZUMAB 300 MG: 150 INJECTION, SOLUTION SUBCUTANEOUS at 10:07

## 2025-07-14 ENCOUNTER — LAB (OUTPATIENT)
Dept: FAMILY MEDICINE CLINIC | Age: 65
End: 2025-07-14

## 2025-07-14 DIAGNOSIS — I10 ESSENTIAL HYPERTENSION: Primary | ICD-10-CM

## 2025-07-14 DIAGNOSIS — F11.10 NARCOTIC ABUSE (HCC): ICD-10-CM

## 2025-07-14 LAB
ALBUMIN SERPL-MCNC: 4.3 G/DL (ref 3.4–5)
ALBUMIN/GLOB SERPL: 2 {RATIO} (ref 1.1–2.2)
ALP SERPL-CCNC: 43 U/L (ref 40–129)
ALT SERPL-CCNC: 34 U/L (ref 10–40)
ANION GAP SERPL CALCULATED.3IONS-SCNC: 13 MMOL/L (ref 3–16)
AST SERPL-CCNC: 29 U/L (ref 15–37)
BILIRUB SERPL-MCNC: 0.5 MG/DL (ref 0–1)
BUN SERPL-MCNC: 17 MG/DL (ref 7–20)
CALCIUM SERPL-MCNC: 9.5 MG/DL (ref 8.3–10.6)
CHLORIDE SERPL-SCNC: 100 MMOL/L (ref 99–110)
CO2 SERPL-SCNC: 23 MMOL/L (ref 21–32)
CREAT SERPL-MCNC: 0.9 MG/DL (ref 0.6–1.2)
GFR SERPLBLD CREATININE-BSD FMLA CKD-EPI: 71 ML/MIN/{1.73_M2}
GLUCOSE SERPL-MCNC: 101 MG/DL (ref 70–99)
POTASSIUM SERPL-SCNC: 5.5 MMOL/L (ref 3.5–5.1)
PROT SERPL-MCNC: 6.5 G/DL (ref 6.4–8.2)
REASON FOR REJECTION: NORMAL
REJECTED TEST: NORMAL
SODIUM SERPL-SCNC: 136 MMOL/L (ref 136–145)

## 2025-07-21 ENCOUNTER — HOSPITAL ENCOUNTER (OUTPATIENT)
Dept: NURSING | Age: 65
Setting detail: INFUSION SERIES
Discharge: HOME | End: 2025-07-21
Payer: MEDICARE

## 2025-07-21 VITALS
OXYGEN SATURATION: 100 % | HEIGHT: 65 IN | BODY MASS INDEX: 22.49 KG/M2 | HEART RATE: 82 BPM | SYSTOLIC BLOOD PRESSURE: 149 MMHG | RESPIRATION RATE: 16 BRPM | DIASTOLIC BLOOD PRESSURE: 82 MMHG | WEIGHT: 135 LBS | TEMPERATURE: 98.2 F

## 2025-07-21 DIAGNOSIS — J45.902 SEVERE ASTHMA WITH STATUS ASTHMATICUS, UNSPECIFIED WHETHER PERSISTENT: Primary | ICD-10-CM

## 2025-07-21 PROCEDURE — 96372 THER/PROPH/DIAG INJ SC/IM: CPT

## 2025-07-21 PROCEDURE — 6360000002 HC RX W HCPCS: Performed by: ALLERGY & IMMUNOLOGY

## 2025-07-21 RX ADMIN — OMALIZUMAB 300 MG: 150 INJECTION, SOLUTION SUBCUTANEOUS at 10:38

## 2025-07-21 ASSESSMENT — PAIN - FUNCTIONAL ASSESSMENT: PAIN_FUNCTIONAL_ASSESSMENT: NONE - DENIES PAIN

## 2025-07-25 DIAGNOSIS — R20.2 PARESTHESIAS: Primary | ICD-10-CM

## 2025-08-11 ENCOUNTER — HOSPITAL ENCOUNTER (OUTPATIENT)
Dept: NURSING | Age: 65
Setting detail: INFUSION SERIES
Discharge: HOME | End: 2025-08-11
Payer: MEDICARE

## 2025-08-11 VITALS
SYSTOLIC BLOOD PRESSURE: 138 MMHG | OXYGEN SATURATION: 99 % | WEIGHT: 135 LBS | BODY MASS INDEX: 22.49 KG/M2 | TEMPERATURE: 98.6 F | DIASTOLIC BLOOD PRESSURE: 84 MMHG | HEART RATE: 88 BPM | HEIGHT: 65 IN | RESPIRATION RATE: 16 BRPM

## 2025-08-11 DIAGNOSIS — J45.902 SEVERE ASTHMA WITH STATUS ASTHMATICUS, UNSPECIFIED WHETHER PERSISTENT: Primary | ICD-10-CM

## 2025-08-11 PROCEDURE — 96372 THER/PROPH/DIAG INJ SC/IM: CPT

## 2025-08-11 PROCEDURE — 6360000002 HC RX W HCPCS: Performed by: ALLERGY & IMMUNOLOGY

## 2025-08-11 RX ADMIN — OMALIZUMAB 300 MG: 150 INJECTION, SOLUTION SUBCUTANEOUS at 10:44

## 2025-08-11 ASSESSMENT — PAIN - FUNCTIONAL ASSESSMENT: PAIN_FUNCTIONAL_ASSESSMENT: 0-10

## 2025-09-02 ENCOUNTER — HOSPITAL ENCOUNTER (OUTPATIENT)
Dept: NURSING | Age: 65
Setting detail: INFUSION SERIES
Discharge: HOME | End: 2025-09-02
Payer: MEDICARE

## 2025-09-02 VITALS
RESPIRATION RATE: 16 BRPM | OXYGEN SATURATION: 99 % | HEART RATE: 95 BPM | BODY MASS INDEX: 22.49 KG/M2 | DIASTOLIC BLOOD PRESSURE: 92 MMHG | HEIGHT: 65 IN | WEIGHT: 135 LBS | SYSTOLIC BLOOD PRESSURE: 155 MMHG | TEMPERATURE: 98.8 F

## 2025-09-02 DIAGNOSIS — J45.902 SEVERE ASTHMA WITH STATUS ASTHMATICUS, UNSPECIFIED WHETHER PERSISTENT: Primary | ICD-10-CM

## 2025-09-02 PROCEDURE — 6360000002 HC RX W HCPCS: Performed by: ALLERGY & IMMUNOLOGY

## 2025-09-02 PROCEDURE — 96372 THER/PROPH/DIAG INJ SC/IM: CPT

## 2025-09-02 RX ADMIN — OMALIZUMAB 300 MG: 150 INJECTION, SOLUTION SUBCUTANEOUS at 10:19

## 2025-09-02 ASSESSMENT — PAIN - FUNCTIONAL ASSESSMENT: PAIN_FUNCTIONAL_ASSESSMENT: 0-10
